# Patient Record
Sex: FEMALE | Race: WHITE | NOT HISPANIC OR LATINO | Employment: OTHER | URBAN - METROPOLITAN AREA
[De-identification: names, ages, dates, MRNs, and addresses within clinical notes are randomized per-mention and may not be internally consistent; named-entity substitution may affect disease eponyms.]

---

## 2020-07-16 ENCOUNTER — HISTORICAL (OUTPATIENT)
Dept: ADMINISTRATIVE | Facility: HOSPITAL | Age: 60
End: 2020-07-16

## 2020-07-16 LAB
ABS NEUT (OLG): 4.8 X10(3)/MCL (ref 2.1–9.2)
ALBUMIN SERPL-MCNC: 3.7 GM/DL (ref 3.4–5)
ALBUMIN/GLOB SERPL: 1.5 RATIO (ref 1.1–2)
ALP SERPL-CCNC: 130 UNIT/L (ref 40–150)
ALT SERPL-CCNC: 15 UNIT/L (ref 0–55)
APPEARANCE, UA: ABNORMAL
AST SERPL-CCNC: 13 UNIT/L (ref 5–34)
BACTERIA SPEC CULT: ABNORMAL /HPF
BASOPHILS # BLD AUTO: 0 X10(3)/MCL (ref 0–0.2)
BASOPHILS NFR BLD AUTO: 0 %
BILIRUB SERPL-MCNC: 0.5 MG/DL
BILIRUB UR QL STRIP: NEGATIVE
BILIRUBIN DIRECT+TOT PNL SERPL-MCNC: 0.2 MG/DL (ref 0–0.5)
BILIRUBIN DIRECT+TOT PNL SERPL-MCNC: 0.3 MG/DL (ref 0–0.8)
BUN SERPL-MCNC: 33.3 MG/DL (ref 9.8–20.1)
CALCIUM SERPL-MCNC: 8.7 MG/DL (ref 8.4–10.2)
CHLORIDE SERPL-SCNC: 108 MMOL/L (ref 98–107)
CO2 SERPL-SCNC: 22 MMOL/L (ref 23–31)
COLOR UR: YELLOW
CREAT SERPL-MCNC: 1.28 MG/DL (ref 0.55–1.02)
DEPRECATED CALCIDIOL+CALCIFEROL SERPL-MC: 17.6 NG/ML (ref 6.6–49.9)
EOSINOPHIL # BLD AUTO: 0.4 X10(3)/MCL (ref 0–0.9)
EOSINOPHIL NFR BLD AUTO: 6 %
ERYTHROCYTE [DISTWIDTH] IN BLOOD BY AUTOMATED COUNT: 15.5 % (ref 11.5–17)
FOLATE SERPL-MCNC: 12.4 NG/ML (ref 7–31.4)
GLOBULIN SER-MCNC: 2.5 GM/DL (ref 2.4–3.5)
GLUCOSE (UA): NEGATIVE
GLUCOSE SERPL-MCNC: 222 MG/DL (ref 82–115)
HCT VFR BLD AUTO: 28.6 % (ref 37–47)
HGB BLD-MCNC: 9 GM/DL (ref 12–16)
HGB UR QL STRIP: ABNORMAL
KETONES UR QL STRIP: NEGATIVE
LEUKOCYTE ESTERASE UR QL STRIP: ABNORMAL
LYMPHOCYTES # BLD AUTO: 1.3 X10(3)/MCL (ref 0.6–4.6)
LYMPHOCYTES NFR BLD AUTO: 18 %
MCH RBC QN AUTO: 28.9 PG (ref 27–31)
MCHC RBC AUTO-ENTMCNC: 31.5 GM/DL (ref 33–36)
MCV RBC AUTO: 92 FL (ref 80–94)
MONOCYTES # BLD AUTO: 0.6 X10(3)/MCL (ref 0.1–1.3)
MONOCYTES NFR BLD AUTO: 8 %
NEUTROPHILS # BLD AUTO: 4.8 X10(3)/MCL (ref 2.1–9.2)
NEUTROPHILS NFR BLD AUTO: 67 %
NITRITE UR QL STRIP: NEGATIVE
PH UR STRIP: 5 [PH] (ref 5–9)
PLATELET # BLD AUTO: 236 X10(3)/MCL (ref 130–400)
PMV BLD AUTO: 10.3 FL (ref 9.4–12.4)
POTASSIUM SERPL-SCNC: 4.8 MMOL/L (ref 3.5–5.1)
PROT SERPL-MCNC: 6.2 GM/DL (ref 5.8–7.6)
PROT UR QL STRIP: ABNORMAL
RBC # BLD AUTO: 3.11 X10(6)/MCL (ref 4.2–5.4)
RBC #/AREA URNS HPF: ABNORMAL /[HPF]
SODIUM SERPL-SCNC: 139 MMOL/L (ref 136–145)
SP GR UR STRIP: 1.01 (ref 1–1.03)
SQUAMOUS EPITHELIAL, UA: ABNORMAL
UROBILINOGEN UR STRIP-ACNC: 1
VIT B12 SERPL-MCNC: 437 PG/ML (ref 213–816)
WBC # SPEC AUTO: 7.2 X10(3)/MCL (ref 4.5–11.5)
WBC #/AREA URNS HPF: 117 /HPF (ref 0–3)

## 2020-07-27 ENCOUNTER — HISTORICAL (OUTPATIENT)
Dept: ADMINISTRATIVE | Facility: HOSPITAL | Age: 60
End: 2020-07-27

## 2020-07-27 LAB
APPEARANCE, UA: CLEAR
BACTERIA #/AREA URNS AUTO: ABNORMAL /HPF
BILIRUB UR QL STRIP: NEGATIVE
COLOR UR: YELLOW
GLUCOSE (UA): NEGATIVE
HGB UR QL STRIP: NEGATIVE
KETONES UR QL STRIP: NEGATIVE
LEUKOCYTE ESTERASE UR QL STRIP: ABNORMAL
NITRITE UR QL STRIP.AUTO: NEGATIVE
PH UR STRIP: 5.5 [PH] (ref 5–9)
PROT UR QL STRIP: ABNORMAL
RBC #/AREA URNS HPF: ABNORMAL /[HPF]
SP GR UR STRIP: 1.01 (ref 1–1.03)
SQUAMOUS EPITHELIAL, UA: ABNORMAL
UROBILINOGEN UR STRIP-ACNC: 0.2
WBC #/AREA URNS AUTO: 63 /HPF (ref 0–3)

## 2020-08-06 ENCOUNTER — HISTORICAL (OUTPATIENT)
Dept: ADMINISTRATIVE | Facility: HOSPITAL | Age: 60
End: 2020-08-06

## 2020-08-06 LAB
ABS NEUT (OLG): 3.9 X10(3)/MCL (ref 2.1–9.2)
BASOPHILS # BLD AUTO: 0 X10(3)/MCL (ref 0–0.2)
BASOPHILS NFR BLD AUTO: 1 %
EOSINOPHIL # BLD AUTO: 0.7 X10(3)/MCL (ref 0–0.9)
EOSINOPHIL NFR BLD AUTO: 11 %
ERYTHROCYTE [DISTWIDTH] IN BLOOD BY AUTOMATED COUNT: 14.8 % (ref 11.5–17)
HCT VFR BLD AUTO: 31.5 % (ref 37–47)
HGB BLD-MCNC: 10 GM/DL (ref 12–16)
LYMPHOCYTES # BLD AUTO: 1.4 X10(3)/MCL (ref 0.6–4.6)
LYMPHOCYTES NFR BLD AUTO: 21 %
MCH RBC QN AUTO: 29.3 PG (ref 27–31)
MCHC RBC AUTO-ENTMCNC: 31.7 GM/DL (ref 33–36)
MCV RBC AUTO: 92.4 FL (ref 80–94)
MONOCYTES # BLD AUTO: 0.4 X10(3)/MCL (ref 0.1–1.3)
MONOCYTES NFR BLD AUTO: 7 %
NEUTROPHILS # BLD AUTO: 3.9 X10(3)/MCL (ref 2.1–9.2)
NEUTROPHILS NFR BLD AUTO: 60 %
PLATELET # BLD AUTO: 247 X10(3)/MCL (ref 130–400)
PMV BLD AUTO: 9.9 FL (ref 9.4–12.4)
RBC # BLD AUTO: 3.41 X10(6)/MCL (ref 4.2–5.4)
WBC # SPEC AUTO: 6.4 X10(3)/MCL (ref 4.5–11.5)

## 2021-01-28 ENCOUNTER — HISTORICAL (OUTPATIENT)
Dept: ADMINISTRATIVE | Facility: HOSPITAL | Age: 61
End: 2021-01-28

## 2021-02-25 ENCOUNTER — HISTORICAL (OUTPATIENT)
Dept: ADMINISTRATIVE | Facility: HOSPITAL | Age: 61
End: 2021-02-25

## 2022-04-30 NOTE — OP NOTE
DATE OF SURGERY:    02/25/2021    SURGEON:  Harris Marquez MD    PREOPERATIVE DIAGNOSES:    1. Dense visually significant cataract in the right eye.  2. Approximately 260 degrees of posterior synechiae in the right eye from chronic uveitis.    POSTOPERATIVE DIAGNOSES:    1. Dense visually significant cataract in the right eye.  2. Approximately 260 degrees of posterior synechiae in the right eye from chronic uveitis.    NAME OF PROCEDURES:    1. Complex cataract extraction with implantation of posterior chamber intraocular lens, right eye.  2. Synechiolysis, right eye.  3. Placement of Malyugin ring, right eye.    ASSISTANT:  None.    ANESTHESIA:  Monitored anesthesia care with intracameral lidocaine.    COMPLICATIONS:  None.    BLOOD LOSS:  Nil.    PROCEDURE IN DETAIL:  After clinical examination and discussion of the risks, benefits, and alternatives, Ms Shah was consented for cataract extraction in the right eye.  She knew this would be a complex case based on the scarring of the iris to the lens capsule.  On the day of the operation, she was brought to the operating room, and all appropriate anesthesia monitors were applied.  Topical lidocaine was placed.  A paracentesis was created, and intracameral lidocaine was placed.  Viscoelastic then followed the lidocaine.  A keratome was used to enter the eye through a near clear corneal incision.  A cyclodialysis spatula was placed into the eye through the surgical wound, and the synechiae were loosened from the adhesions to the anterior capsule.  Subsequent to this, it was noted that the iris still did not dilate.  At this point, a Malyugin ring was placed on the iris.  This did give us adequate visualization to proceed.  At this point, a continuous curvilinear capsulorrhexis was made in the capsule.  Hydrodissection of the lens nucleus was achieved.  The cataract was then removed by way of phacoemulsification.  The I and A handpiece was used to remove the  remaining cortex.  Viscoelastic was then used to reform the anterior chamber and capsular bag.  Intraocular lens small ZCB00 with a power of 27.5 diopters was removed.  The package was inspected and found to be in satisfactory condition.  This lens was then placed in the injector system, and the lens was inserted into the eye.  Position was verified with the I and A handpiece.  At this point, the Malyugin ring was then removed from the eye.  The pupil did constrict nicely.  The I and A handpiece were then used to remove the remaining viscoelastic.  BSS on a cannula was then used to hydrate the surgical wounds.  Intraocular pressure was approximated at normal.  The wounds were inspected and found to be watertight to Weck-Delfina and digital pressure.  The eye was then given a final visual inspection and noted to have a well-placed PCIOL in good position within the capsular bag.  There was no bleeding from the iris after the stretching.  The intra-ocular pressure remained at approximately normal, and the wounds did appear to be watertight.  At this point, the eye was liberally coated with antibiotics and shielded without being pressure patched.  The patient was transported from the operating room, having tolerated the procedure well.        ______________________________  MD BECKY Zamudio/SK  DD:  03/09/2021  Time:  02:24PM  DT:  03/09/2021  Time:  02:54PM  Job #:  [id number]

## 2022-04-30 NOTE — OP NOTE
DATE OF SURGERY:    01/28/2021    SURGEON:  Harris Marquez MD  ASSISTANT:  None    PREOPERATIVE DIAGNOSES:    1. Very dense nuclear sclerotic cataract.  2. Pupillary miosis.  3. Proliferative diabetic retinopathy.    POSTOPERATIVE DIAGNOSES:    1. Very dense nuclear sclerotic cataract.  2. Pupillary miosis.  3. Proliferative diabetic retinopathy.    PROCEDURE:    1. Phacoemulsification with implantation of posterior chamber intraocular lens.  2. Injection of Shugarcaine to enhance the pharmacologic dilation.  3. Pupillary manipulation with mechanical and pharmaceutical means.    ANESTHESIA:  Monitored anesthesia care with intracameral lidocaine.    COMPLICATION:  None.    BLOOD LOSS:  Nil.    PROCEDURE IN DETAIL:  After clinical examination and discussion of the risks, benefits, and alternatives, including the dense nature of the cataract and the possibility for intraocular complications from that and her poorly dilating pupil, Ms. Shah was consented for cataract extraction in the left eye.  On the day of the operation, she was brought to the operating room and all appropriate anesthesia monitors were applied.  The eye was then prepped and draped in the usual sterile ophthalmic fashion.  A paracentesis site was created.  The intracameral lidocaine was placed in the eye.  Viscoelastic was then placed in the anterior chamber.  A keratome was used to enter the eye.  A continuous curvilinear capsulorrhexis was made.  Hydrodissection of the lens nucleus was achieved.  Prior to insertion of the phaco handpiece, Shugarcaine was placed into the eye and positioned 360 degrees on the surface of the iris and underneath the iris.  This did help maintain dilation somewhat throughout the procedure.  The phaco handpiece was introduced in the eye and the cataract was removed by way of phacoemulsification.  At multiple times throughout the procedure, additional viscoelastic was used to increase the visualization.   Additionally, mechanical manipulation of the iris was needed multiple times to visualize the surgical area.  At the end of the phacoemulsification, the I/A handpiece was introduced in the eye and the remaining cortex was removed.  The anterior chambers and capsular bag were then refilled with viscoelastic.  An intraocular lens model ZCB00 with a power of 23.0 diopters was removed from the package, inspected and found to be in satisfactory condition.  This lens was then placed in  and positioned within the eye.  Position was verified with the I/A handpiece.  The I/A handpiece was used to remove any remaining viscoelastic.  BSS on a cannula was then used to hydrate the surgical wounds.  Intraocular pressure was approximately normal.  The eye was given a final visual inspection and noted to have a well placed PCIOL in good position within the capsular bag.  The anterior chamber did appear to be deep and quiet.  The pupil was very small and the intraocular pressure was approximately normal.  The eye was then liberally coated with antibiotics and shielded without being pressure patched.  The patient was transported from the operating room having tolerated procedure well.        ______________________________  MD BECKY Zamudio/GEOVANY  DD:  02/03/2021  Time:  03:00PM  DT:  02/03/2021  Time:  03:27PM  Job #:  [id number]

## 2022-08-18 ENCOUNTER — LAB REQUISITION (OUTPATIENT)
Dept: LAB | Facility: HOSPITAL | Age: 62
End: 2022-08-18
Payer: MEDICARE

## 2022-08-18 DIAGNOSIS — E11.649 TYPE 2 DIABETES MELLITUS WITH HYPOGLYCEMIA WITHOUT COMA: ICD-10-CM

## 2022-08-18 DIAGNOSIS — Z79.4 LONG TERM (CURRENT) USE OF INSULIN: ICD-10-CM

## 2022-08-18 DIAGNOSIS — N30.00 ACUTE CYSTITIS WITHOUT HEMATURIA: ICD-10-CM

## 2022-08-18 PROCEDURE — 83036 HEMOGLOBIN GLYCOSYLATED A1C: CPT | Performed by: FAMILY MEDICINE

## 2022-08-25 LAB
EST. AVERAGE GLUCOSE BLD GHB EST-MCNC: 122.6 MG/DL
HBA1C MFR BLD: 5.9 %

## 2022-10-12 ENCOUNTER — HOSPITAL ENCOUNTER (EMERGENCY)
Facility: HOSPITAL | Age: 62
Discharge: HOME | End: 2022-10-12
Attending: STUDENT IN AN ORGANIZED HEALTH CARE EDUCATION/TRAINING PROGRAM
Payer: MEDICARE

## 2022-10-12 VITALS
HEART RATE: 80 BPM | BODY MASS INDEX: 26.49 KG/M2 | TEMPERATURE: 97 F | RESPIRATION RATE: 12 BRPM | DIASTOLIC BLOOD PRESSURE: 85 MMHG | WEIGHT: 159 LBS | OXYGEN SATURATION: 98 % | SYSTOLIC BLOOD PRESSURE: 177 MMHG | HEIGHT: 65 IN

## 2022-10-12 DIAGNOSIS — I95.1 ORTHOSTATIC HYPOTENSION: ICD-10-CM

## 2022-10-12 DIAGNOSIS — R53.1 GENERALIZED WEAKNESS: ICD-10-CM

## 2022-10-12 DIAGNOSIS — N39.0 URINARY TRACT INFECTION WITHOUT HEMATURIA, SITE UNSPECIFIED: Primary | ICD-10-CM

## 2022-10-12 DIAGNOSIS — E86.0 DEHYDRATION: ICD-10-CM

## 2022-10-12 DIAGNOSIS — R53.1 WEAKNESS: ICD-10-CM

## 2022-10-12 LAB
ALBUMIN SERPL-MCNC: 3.4 GM/DL (ref 3.4–4.8)
ALBUMIN/GLOB SERPL: 1.6 RATIO (ref 1.1–2)
ALP SERPL-CCNC: 110 UNIT/L (ref 40–150)
ALT SERPL-CCNC: 7 UNIT/L (ref 0–55)
APPEARANCE UR: ABNORMAL
AST SERPL-CCNC: 11 UNIT/L (ref 5–34)
BACTERIA #/AREA URNS AUTO: ABNORMAL /HPF
BASOPHILS # BLD AUTO: 0.06 X10(3)/MCL (ref 0–0.2)
BASOPHILS NFR BLD AUTO: 1.1 %
BILIRUB UR QL STRIP.AUTO: NEGATIVE MG/DL
BILIRUBIN DIRECT+TOT PNL SERPL-MCNC: 0.7 MG/DL
BUN SERPL-MCNC: 39.6 MG/DL (ref 9.8–20.1)
CALCIUM SERPL-MCNC: 8.6 MG/DL (ref 8.4–10.2)
CHLORIDE SERPL-SCNC: 112 MMOL/L (ref 98–107)
CO2 SERPL-SCNC: 21 MMOL/L (ref 23–31)
COLOR UR AUTO: YELLOW
CREAT SERPL-MCNC: 2.46 MG/DL (ref 0.55–1.02)
EOSINOPHIL # BLD AUTO: 0.19 X10(3)/MCL (ref 0–0.9)
EOSINOPHIL NFR BLD AUTO: 3.6 %
ERYTHROCYTE [DISTWIDTH] IN BLOOD BY AUTOMATED COUNT: 15 % (ref 11.5–17)
GFR SERPLBLD CREATININE-BSD FMLA CKD-EPI: 22 MLS/MIN/1.73/M2
GLOBULIN SER-MCNC: 2.1 GM/DL (ref 2.4–3.5)
GLUCOSE SERPL-MCNC: 166 MG/DL (ref 82–115)
GLUCOSE UR QL STRIP.AUTO: NEGATIVE MG/DL
HCT VFR BLD AUTO: 25.5 % (ref 37–47)
HGB BLD-MCNC: 8.7 GM/DL (ref 12–16)
IMM GRANULOCYTES # BLD AUTO: 0.02 X10(3)/MCL (ref 0–0.04)
IMM GRANULOCYTES NFR BLD AUTO: 0.4 %
KETONES UR QL STRIP.AUTO: ABNORMAL MG/DL
LEUKOCYTE ESTERASE UR QL STRIP.AUTO: ABNORMAL UNIT/L
LYMPHOCYTES # BLD AUTO: 1.09 X10(3)/MCL (ref 0.6–4.6)
LYMPHOCYTES NFR BLD AUTO: 20.9 %
MAGNESIUM SERPL-MCNC: 1.9 MG/DL (ref 1.6–2.6)
MCH RBC QN AUTO: 28.7 PG (ref 27–31)
MCHC RBC AUTO-ENTMCNC: 34.1 MG/DL (ref 33–36)
MCV RBC AUTO: 84.2 FL (ref 80–94)
MONOCYTES # BLD AUTO: 0.37 X10(3)/MCL (ref 0.1–1.3)
MONOCYTES NFR BLD AUTO: 7.1 %
NEUTROPHILS # BLD AUTO: 3.5 X10(3)/MCL (ref 2.1–9.2)
NEUTROPHILS NFR BLD AUTO: 66.9 %
NITRITE UR QL STRIP.AUTO: NEGATIVE
NRBC BLD AUTO-RTO: 0 %
PH UR STRIP.AUTO: 6 [PH]
PLATELET # BLD AUTO: 172 X10(3)/MCL (ref 130–400)
PMV BLD AUTO: 8.9 FL (ref 7.4–10.4)
POCT GLUCOSE: 96 MG/DL (ref 70–110)
POTASSIUM SERPL-SCNC: 4.3 MMOL/L (ref 3.5–5.1)
PROT SERPL-MCNC: 5.5 GM/DL (ref 5.8–7.6)
PROT UR QL STRIP.AUTO: ABNORMAL MG/DL
RBC # BLD AUTO: 3.03 X10(6)/MCL (ref 4.2–5.4)
RBC #/AREA URNS AUTO: 6 /HPF
RBC UR QL AUTO: ABNORMAL UNIT/L
SARS-COV-2 RDRP RESP QL NAA+PROBE: NEGATIVE
SODIUM SERPL-SCNC: 137 MMOL/L (ref 136–145)
SP GR UR STRIP.AUTO: 1.01 (ref 1–1.03)
SQUAMOUS #/AREA URNS AUTO: <5 /HPF
TROPONIN I SERPL-MCNC: 0.02 NG/ML (ref 0–0.04)
TROPONIN I SERPL-MCNC: 0.08 NG/ML (ref 0–0.04)
UROBILINOGEN UR STRIP-ACNC: 0.2 MG/DL
WBC # SPEC AUTO: 5.2 X10(3)/MCL (ref 4.5–11.5)
WBC #/AREA URNS AUTO: 295 /HPF

## 2022-10-12 PROCEDURE — 84484 ASSAY OF TROPONIN QUANT: CPT

## 2022-10-12 PROCEDURE — 93010 ELECTROCARDIOGRAM REPORT: CPT | Mod: ,,, | Performed by: INTERNAL MEDICINE

## 2022-10-12 PROCEDURE — 87635 SARS-COV-2 COVID-19 AMP PRB: CPT | Performed by: STUDENT IN AN ORGANIZED HEALTH CARE EDUCATION/TRAINING PROGRAM

## 2022-10-12 PROCEDURE — 93010 EKG 12-LEAD: ICD-10-PCS | Mod: ,,, | Performed by: INTERNAL MEDICINE

## 2022-10-12 PROCEDURE — 36415 COLL VENOUS BLD VENIPUNCTURE: CPT | Performed by: STUDENT IN AN ORGANIZED HEALTH CARE EDUCATION/TRAINING PROGRAM

## 2022-10-12 PROCEDURE — 83735 ASSAY OF MAGNESIUM: CPT | Performed by: STUDENT IN AN ORGANIZED HEALTH CARE EDUCATION/TRAINING PROGRAM

## 2022-10-12 PROCEDURE — 93005 ELECTROCARDIOGRAM TRACING: CPT

## 2022-10-12 PROCEDURE — 63600175 PHARM REV CODE 636 W HCPCS: Performed by: STUDENT IN AN ORGANIZED HEALTH CARE EDUCATION/TRAINING PROGRAM

## 2022-10-12 PROCEDURE — 25000003 PHARM REV CODE 250: Performed by: STUDENT IN AN ORGANIZED HEALTH CARE EDUCATION/TRAINING PROGRAM

## 2022-10-12 PROCEDURE — 84484 ASSAY OF TROPONIN QUANT: CPT | Performed by: STUDENT IN AN ORGANIZED HEALTH CARE EDUCATION/TRAINING PROGRAM

## 2022-10-12 PROCEDURE — 99285 EMERGENCY DEPT VISIT HI MDM: CPT | Mod: 25

## 2022-10-12 PROCEDURE — 81001 URINALYSIS AUTO W/SCOPE: CPT | Performed by: STUDENT IN AN ORGANIZED HEALTH CARE EDUCATION/TRAINING PROGRAM

## 2022-10-12 PROCEDURE — 85025 COMPLETE CBC W/AUTO DIFF WBC: CPT | Performed by: STUDENT IN AN ORGANIZED HEALTH CARE EDUCATION/TRAINING PROGRAM

## 2022-10-12 PROCEDURE — 80053 COMPREHEN METABOLIC PANEL: CPT | Performed by: STUDENT IN AN ORGANIZED HEALTH CARE EDUCATION/TRAINING PROGRAM

## 2022-10-12 PROCEDURE — 87077 CULTURE AEROBIC IDENTIFY: CPT | Performed by: STUDENT IN AN ORGANIZED HEALTH CARE EDUCATION/TRAINING PROGRAM

## 2022-10-12 PROCEDURE — 82962 GLUCOSE BLOOD TEST: CPT

## 2022-10-12 RX ORDER — CEFDINIR 300 MG/1
300 CAPSULE ORAL 2 TIMES DAILY
Qty: 20 CAPSULE | Refills: 0 | Status: SHIPPED | OUTPATIENT
Start: 2022-10-12 | End: 2022-10-22

## 2022-10-12 RX ORDER — ASPIRIN 325 MG
325 TABLET ORAL
Status: COMPLETED | OUTPATIENT
Start: 2022-10-12 | End: 2022-10-12

## 2022-10-12 RX ADMIN — ASPIRIN 325 MG ORAL TABLET 325 MG: 325 PILL ORAL at 11:10

## 2022-10-12 RX ADMIN — SODIUM CHLORIDE, POTASSIUM CHLORIDE, SODIUM LACTATE AND CALCIUM CHLORIDE 1000 ML: 600; 310; 30; 20 INJECTION, SOLUTION INTRAVENOUS at 07:10

## 2022-10-12 RX ADMIN — CEFTRIAXONE SODIUM 1 G: 1 INJECTION, POWDER, FOR SOLUTION INTRAMUSCULAR; INTRAVENOUS at 01:10

## 2022-10-12 NOTE — ED PROVIDER NOTES
Encounter Date: 10/12/2022    SCRIBE #1 NOTE: I, Meli Ashley, am scribing for, and in the presence of,  Adryan Majano IV, MD. I have scribed the following portions of the note - the EKG reading. Other sections scribed: HPI, ROS, PE.     History     Chief Complaint   Patient presents with    Weakness     C/o weakness and shakiness since this morning. Seen in ED yesterday for dehydration after doing a colon prep for colonoscopy today. Patient reports possible hypotension and hypoglycemia. Patient ate a poptart PTA. CBG in triage 96.      62 Y.O. female with a history of COPD, CKD, DM, PE, stroke, recurrent UTI, asthma, and O2 at home presents to the ED for weakness, shakiness, and possible hypotension onset yesterday. Pt went to [facility] ED yesterday via EMS for possible syncope during PCP appointment in which she became dizzy and had low BP after not eating and undergoing colonoscopy prep. At [facility] she was given 1L of fluid after diagnosis of dehydration and positive orthostatic hypotension. She did not undergo procedure today due to her BP and glucose dropping while there. Since then, she has had a pop tart and orange juice and reports feeling better, but still tired and shaky. She also states that yesterday's fluids helped. Pt denies fever, N/V, SOB, urinary problems, and being on blood thinners.    Pt's GI is Dr. Antonio Parnell MD.    The history is provided by the patient. No  was used.   Illness   The current episode started yesterday. The problem has been gradually improving. Pertinent negatives include no fever, no abdominal pain, no nausea, no vomiting, no congestion, no rhinorrhea, no sore throat, no cough, no shortness of breath and no rash. Recently, medical care has been given at another facility.   Review of patient's allergies indicates:   Allergen Reactions    Iodine Shortness Of Breath    Shellfish containing products Shortness Of Breath    Amlodipine     Actos  [pioglitazone] Nausea And Vomiting     Past Medical History:   Diagnosis Date    Asthma     Renal disorder      Past Surgical History:   Procedure Laterality Date    CHOLECYSTECTOMY  2003    EYE SURGERY      FOOT SURGERY  2020    TONSILLECTOMY      URETERAL STENT PLACEMENT       No family history on file.  Social History     Tobacco Use    Smoking status: Never    Smokeless tobacco: Never     Review of Systems   Constitutional:  Positive for fatigue. Negative for chills and fever.   HENT:  Negative for congestion, rhinorrhea and sore throat.    Eyes:  Negative for visual disturbance.   Respiratory:  Negative for cough and shortness of breath.    Cardiovascular:  Negative for chest pain and leg swelling.   Gastrointestinal:  Negative for abdominal pain, nausea and vomiting.   Genitourinary:  Negative for dysuria, hematuria, vaginal bleeding and vaginal discharge.   Musculoskeletal:  Negative for joint swelling.   Skin:  Negative for rash.   Neurological:  Positive for dizziness, weakness and light-headedness.   Psychiatric/Behavioral:  Negative for confusion.      Physical Exam     Initial Vitals [10/12/22 0712]   BP Pulse Resp Temp SpO2   96/67 83 20 97 °F (36.1 °C) 100 %      MAP       --         Physical Exam    Nursing note and vitals reviewed.  Constitutional: She is not diaphoretic. No distress.   HENT:   Head: Normocephalic and atraumatic.   Eyes: EOM are normal. Pupils are equal, round, and reactive to light.   Neck: Neck supple.   Normal range of motion.  Cardiovascular:  Normal rate and regular rhythm.           No murmur heard.  Pulmonary/Chest: Breath sounds normal. No respiratory distress. She has no wheezes. She has no rales.   Abdominal: Abdomen is soft. She exhibits no distension. There is no abdominal tenderness.   Musculoskeletal:         General: Normal range of motion.      Cervical back: Normal range of motion and neck supple.     Neurological: She is alert and oriented to person, place, and  time. She has normal strength.   Skin: Skin is warm. No rash noted.   Psychiatric: She has a normal mood and affect.       ED Course   Procedures  Labs Reviewed   COMPREHENSIVE METABOLIC PANEL - Abnormal; Notable for the following components:       Result Value    Chloride 112 (*)     Carbon Dioxide 21 (*)     Glucose Level 166 (*)     Blood Urea Nitrogen 39.6 (*)     Creatinine 2.46 (*)     Protein Total 5.5 (*)     Globulin 2.1 (*)     All other components within normal limits   URINALYSIS, REFLEX TO URINE CULTURE - Abnormal; Notable for the following components:    Appearance, UA Cloudy (*)     Protein, UA 2+ (*)     Ketones, UA Trace (*)     Blood, UA 2+ (*)     Leukocyte Esterase, UA 3+ (*)     All other components within normal limits   TROPONIN I - Abnormal; Notable for the following components:    Troponin-I 0.078 (*)     All other components within normal limits   CBC WITH DIFFERENTIAL - Abnormal; Notable for the following components:    RBC 3.03 (*)     Hgb 8.7 (*)     Hct 25.5 (*)     All other components within normal limits   URINALYSIS, MICROSCOPIC - Abnormal; Notable for the following components:    RBC, UA 6 (*)     WBC,  (*)     Bacteria, UA 3+ (*)     All other components within normal limits   SARS-COV-2 RNA AMPLIFICATION, QUAL - Normal   MAGNESIUM - Normal   TROPONIN I - Normal   CULTURE, URINE   CBC W/ AUTO DIFFERENTIAL    Narrative:     The following orders were created for panel order CBC auto differential.  Procedure                               Abnormality         Status                     ---------                               -----------         ------                     CBC with Differential[[id number]]        Abnormal            Final result                 Please view results for these tests on the individual orders.   POCT GLUCOSE     EKG Readings: (Independently Interpreted)   Initial Reading: No STEMI. Rhythm: Normal Sinus Rhythm. Heart Rate: 78. Ectopy: No Ectopy.  Conduction: Normal. ST Segments: Normal ST Segments. ST Segment Elevation: II, III, AVF, V4, V5 and V6. T Waves Flipped: I and AVL. Axis: Normal. Clinical Impression: Normal Sinus Rhythm   EKG performed at 0739 on 10/12/22.   T wave peaked inferolaterall, possible subtle inferolateral ST depression  No prior for comparison   Borderline prolongation of QRS at 100. No prior EKG.    Other EKG Interpretations: Repeat at 1037 - unchanged T wave inversion I avl, less significant depression inferolateral   ECG Results              EKG 12-lead (Final result)  Result time 10/12/22 13:09:49      Final result by Interface, Lab In [facility] (10/12/22 13:09:49)                   Narrative:    Test Reason : R53.1,    Vent. Rate : 078 BPM     Atrial Rate : 078 BPM     P-R Int : 168 ms          QRS Dur : 094 ms      QT Int : 408 ms       P-R-T Axes : 066 011 105 degrees     QTc Int : 465 ms    Normal sinus rhythm  T wave abnormality, consider lateral ischemia  Abnormal ECG  When compared with ECG of 12-OCT-2022 07:39,  No significant change was found  Confirmed by Tenzin Avila MD ([id number]) on 10/12/2022 1:09:38 PM    Referred By: [email or web address]   SELF           Confirmed By:Tenzin Avila MD                                     EKG 12-lead (Final result)  Result time 10/12/22 13:05:20      Final result by Interface, Lab In [facility] (10/12/22 13:05:20)                   Narrative:    Test Reason : R53.1,    Vent. Rate : 078 BPM     Atrial Rate : 078 BPM     P-R Int : 162 ms          QRS Dur : 100 ms      QT Int : 414 ms       P-R-T Axes : 077 -20 095 degrees     QTc Int : 471 ms    Normal sinus rhythm  Nonspecific T wave abnormality cannot exclude lateral ischemia  Abnormal ECG  No previous ECGs available  Confirmed by Tenzin Avila MD ([id number]) on 10/12/2022 1:05:09 PM    Referred By:             Confirmed By:Tenzin Avila MD                                  Imaging Results              X-Ray Chest AP Portable (Final result)  Result time  10/12/22 07:57:36      Final result by Angel Vega MD (10/12/22 07:57:36)                   Narrative:    EXAMINATION  XR CHEST AP PORTABLE    CLINICAL HISTORY  generalized weakness;    TECHNIQUE  A total of 1 frontal view(s) of the chest.    COMPARISON  23 June 2020    FINDINGS  Lines/tubes/devices: ECG leads overlie the imaged region.    The cardiomediastinal silhouette and central pulmonary vasculature are unremarkable for utilized technique.  The trachea is midline. There is no lobar consolidation, pleural effusion, or pneumothorax.    There is no acute osseous or extrathoracic abnormality.    IMPRESSION  No convincing acute radiographic abnormality.      Electronically signed by: Angel Vega  Date:    10/12/2022  Time:    07:57                                     Medications   lactated ringers bolus 1,000 mL (0 mLs Intravenous Stopped 10/12/22 1014)   aspirin tablet 325 mg (325 mg Oral Given 10/12/22 1119)   cefTRIAXone (ROCEPHIN) 1 g in dextrose 5 % in water (D5W) 5 % 50 mL IVPB (MB+) (0 g Intravenous Stopped 10/12/22 1333)     Medical Decision Making:   History:   Old Medical Records: I decided to obtain old medical records.  Initial Assessment:   61 yo with recurrent UTI, DM, recent colonoscopy prep with poor po intake - went to [facility] yesterday, dehydrated and orthostatic hypotension, hydrated, felt better and discharge. Back today for similar symptoms. Workup with UTI - history of recurrent, chronic R ureteral stent. Has resolved with cephalosporins prior per patient, no urine culture previous data available for review. Patient feels better after IVF here, not systemically ill. Treated with omnicef, will follow up culture results to ensure proper treatment and patient instructed to call urologist for follow up today.   Differential Diagnosis:   Orthostatic hypotension, dehydration, electrolyte derangement, LAMIN, infection   Independently Interpreted Test(s):   I have ordered and independently  interpreted EKG Reading(s) - see prior notes  Clinical Tests:   Lab Tests: Ordered and Reviewed  Radiological Study: Ordered and Reviewed  Medical Tests: Ordered and Reviewed  ED Management:  IVF        Scribe Attestation:   Scribe #1: I performed the above scribed service and the documentation accurately describes the services I performed. I attest to the accuracy of the note.    Attending Attestation:           Physician Attestation for Scribe:  Physician Attestation Statement for Scribe #1: I, Adryan Majano IV, MD, reviewed documentation, as scribed by Meli Ashley in my presence, and it is both accurate and complete.           ED Course as of 10/12/22 1455   Wed Oct 12, 2022   0754 Orthostatic positive - drop 130/90 to 80/40 with standing. Will continue fluid hydration.  [AC]   0842 Labs never sent up - will have to redraw at this time.  [AC]   1004 Cr improved from yesterday at [facility]. Trop mild elevation. Will repeat trop, EKG. Will reassess after IVF.  [AC]   1227 Troponin I: 0.017 [AC]   1227 UA with UTI [AC]   1248 Repeat trop negative, no CP, SOB. EKG with some T wave abnormalities as documented above - per chart review had abnormalities at [facility] yesterday, cannot see image of EKG from there. Troponin initial mild elevation likely 2/2 CKD, 2nd trop wnl. UA with UTI - patient reports history of chronic UTI, R ureteral stent. Follow with Dr. Pennington urology. No back pain, not systemically ill at this time. Unable to see previous urine culture results. Will give rocephin, prescribed 3rd generation cephalosporin and have follow with urologist. Patient feels better after IVF. Creatinine improved from yesterday. Will discharge, return precautions given. Instructed to call Dr. Pennington for expedited follow up.  [AC]      ED Course User Index  [AC] Adryan Majano IV, MD                 Clinical Impression:   Final diagnoses:  [R53.1] Weakness  [R53.1] Generalized weakness  [N39.0] Urinary tract infection without  hematuria, site unspecified (Primary)  [E86.0] Dehydration  [I95.1] Orthostatic hypotension        ED Disposition Condition    Discharge Stable          ED Prescriptions       Medication Sig Dispense Start Date End Date Auth. Provider    cefdinir (OMNICEF) 300 MG capsule Take 1 capsule (300 mg total) by mouth 2 (two) times daily. for 10 days 20 capsule 10/12/2022 10/22/2022 Adryan Majano IV, MD          Follow-up Information       Follow up With Specialties Details Why Contact Info    [facility] - Emergency Dept Emergency Medicine Go to  If symptoms worsen [address] [facility] [id number] [phone]    Sukhdev Pennington MD Pediatric Urology Schedule an appointment as soon as possible for a visit   [address] [facility] [address]  [facility]  [facility] [address]  [phone]      PCP  Schedule an appointment as soon as possible for a visit           I, Adryan Majano MD personally performed the history, PE, MDM, and procedures as documented above and agree with the scribe's documentation.        Adryan Majano IV, MD  10/12/22 1459       Adryan Majano IV, MD  10/13/22 [id number]

## 2022-10-12 NOTE — DISCHARGE INSTRUCTIONS
Call urologist for follow up     Follow up with your primary care physician in 2-3 days.      Return to the emergency department if any worsening symptoms, fever, chest pain, difficulty breathing, or any other new symptoms or concerns.      Please signup for MyChart as noted below in your paperwork to review all labwork, imaging results, and any other incidental findings from today's visit.       Advice after your visit:  Your visit in the emergency department is NOT definitive care - please follow-up with your primary care doctor and/or specialist within 1-2 days.  Please return if you have any worsening in your condition or if you have any other concerns.    If you had radiology exams like an XRAY or CT in the emergency Department the interpreation on them may be preliminary - there may be less time sensitive findings on the reports please obtain these reports within 24 hours from the hospital or by using your out on your mobile phone to access records.  Bring these to your primary care doctor and/or specialist for further review of incidental findings.    Please review any LAB WORK from your visit today with your primary care physician.    If you were prescribed OPIATE PAIN MEDICATION - please understand of these medications can be addictive, you may fill less of the prescription was written for, you do not have to take the full prescription.  You may discard what you do not use.  Please seek help if you feel you are having problems with addiction.  Do not drive or operate heavy machinery if you are taking sedating medications.  Do not mix these medications with alcohol.      If you had a SPLINT placed in the emergency department if you have severe pain numbness tingling or discoloration of year digits please remove the splint and return to the emergency department for further evaluation as this may represent a sign of compromise to the nerves or blood vessels due to swelling.    If you had SUTURES in the  emergency department please have them removed in the prescribed time frame typically within 7-14 days.  You may shower but please do not bathe or swim.  Keep the wounds clean and dry and covered with a clean dressing.  Please return if he have any signs of infection like redness or drainage or pain at the suture site.    Please take the full course of  any ANTIBIOTICS you were prescribed - incomplete courses of antibiotics can cause resistance to antibiotics in the future which will make it difficult to treat any infections you may have.

## 2022-10-14 LAB — BACTERIA UR CULT: ABNORMAL

## 2022-12-09 DIAGNOSIS — N18.9 ANEMIA OF CHRONIC RENAL FAILURE, UNSPECIFIED CKD STAGE: ICD-10-CM

## 2022-12-09 DIAGNOSIS — D63.1 ANEMIA OF CHRONIC RENAL FAILURE, UNSPECIFIED CKD STAGE: ICD-10-CM

## 2022-12-09 DIAGNOSIS — N18.4 CHRONIC KIDNEY DISEASE, STAGE IV (SEVERE): ICD-10-CM

## 2022-12-21 ENCOUNTER — TELEPHONE (OUTPATIENT)
Dept: INFUSION THERAPY | Facility: HOSPITAL | Age: 62
End: 2022-12-21
Payer: MEDICARE

## 2022-12-22 ENCOUNTER — LAB VISIT (OUTPATIENT)
Dept: LAB | Facility: HOSPITAL | Age: 62
End: 2022-12-22
Attending: STUDENT IN AN ORGANIZED HEALTH CARE EDUCATION/TRAINING PROGRAM
Payer: MEDICARE

## 2022-12-22 ENCOUNTER — INFUSION (OUTPATIENT)
Dept: INFUSION THERAPY | Facility: HOSPITAL | Age: 62
End: 2022-12-22
Attending: STUDENT IN AN ORGANIZED HEALTH CARE EDUCATION/TRAINING PROGRAM
Payer: MEDICARE

## 2022-12-22 VITALS
HEART RATE: 82 BPM | BODY MASS INDEX: 27.39 KG/M2 | OXYGEN SATURATION: 100 % | WEIGHT: 164.38 LBS | TEMPERATURE: 97 F | DIASTOLIC BLOOD PRESSURE: 79 MMHG | SYSTOLIC BLOOD PRESSURE: 116 MMHG | HEIGHT: 65 IN | RESPIRATION RATE: 16 BRPM

## 2022-12-22 DIAGNOSIS — N18.9 ANEMIA OF CHRONIC RENAL FAILURE, UNSPECIFIED CKD STAGE: ICD-10-CM

## 2022-12-22 DIAGNOSIS — N18.4 CHRONIC KIDNEY DISEASE, STAGE IV (SEVERE): Primary | ICD-10-CM

## 2022-12-22 DIAGNOSIS — I12.9 PARENCHYMAL RENAL HYPERTENSION: ICD-10-CM

## 2022-12-22 DIAGNOSIS — N16: ICD-10-CM

## 2022-12-22 DIAGNOSIS — D63.1 ANEMIA OF CHRONIC RENAL FAILURE, UNSPECIFIED CKD STAGE: ICD-10-CM

## 2022-12-22 LAB
FERRITIN SERPL-MCNC: 1065.42 NG/ML (ref 4.63–204)
HCT VFR BLD AUTO: 24.1 % (ref 37–47)
HGB BLD-MCNC: 7.8 GM/DL (ref 12–16)
IRON SATN MFR SERPL: 28 % (ref 20–50)
IRON SERPL-MCNC: 49 UG/DL (ref 50–170)
TIBC SERPL-MCNC: 129 UG/DL (ref 70–310)
TIBC SERPL-MCNC: 178 UG/DL (ref 250–450)
TRANSFERRIN SERPL-MCNC: 151 MG/DL (ref 173–360)

## 2022-12-22 PROCEDURE — 85014 HEMATOCRIT: CPT

## 2022-12-22 PROCEDURE — 96372 THER/PROPH/DIAG INJ SC/IM: CPT

## 2022-12-22 PROCEDURE — 36415 COLL VENOUS BLD VENIPUNCTURE: CPT

## 2022-12-22 PROCEDURE — 63600175 PHARM REV CODE 636 W HCPCS: Mod: JG | Performed by: STUDENT IN AN ORGANIZED HEALTH CARE EDUCATION/TRAINING PROGRAM

## 2022-12-22 PROCEDURE — 82728 ASSAY OF FERRITIN: CPT

## 2022-12-22 PROCEDURE — 83550 IRON BINDING TEST: CPT

## 2022-12-22 RX ADMIN — EPOETIN ALFA 10000 UNITS: 10000 SOLUTION INTRAVENOUS; SUBCUTANEOUS at 01:12

## 2023-01-18 ENCOUNTER — LAB VISIT (OUTPATIENT)
Dept: LAB | Facility: HOSPITAL | Age: 63
End: 2023-01-18
Attending: FAMILY MEDICINE
Payer: MEDICARE

## 2023-01-18 DIAGNOSIS — J90 EXUDATIVE PLEURISY: ICD-10-CM

## 2023-01-18 DIAGNOSIS — D63.1 ANEMIA OF CHRONIC RENAL FAILURE, UNSPECIFIED CKD STAGE: ICD-10-CM

## 2023-01-18 DIAGNOSIS — R07.89 OTHER CHEST PAIN: ICD-10-CM

## 2023-01-18 DIAGNOSIS — R68.89 MECHANICAL AND MOTOR PROBLEMS WITH INTERNAL ORGANS: ICD-10-CM

## 2023-01-18 DIAGNOSIS — Z92.89 STATUS POST ALCOHOL DETOXIFICATION: ICD-10-CM

## 2023-01-18 DIAGNOSIS — N18.4 CHRONIC KIDNEY DISEASE, STAGE IV (SEVERE): ICD-10-CM

## 2023-01-18 DIAGNOSIS — D72.829 LEUKOCYTOSIS, UNSPECIFIED TYPE: ICD-10-CM

## 2023-01-18 DIAGNOSIS — D72.9 WHITE BLOOD CELL DISORDER: ICD-10-CM

## 2023-01-18 DIAGNOSIS — N16: ICD-10-CM

## 2023-01-18 DIAGNOSIS — I50.33 ACUTE ON CHRONIC DIASTOLIC HEART FAILURE: ICD-10-CM

## 2023-01-18 DIAGNOSIS — I12.9 PARENCHYMAL RENAL HYPERTENSION: ICD-10-CM

## 2023-01-18 DIAGNOSIS — N17.9 ACUTE KIDNEY FAILURE, UNSPECIFIED: ICD-10-CM

## 2023-01-18 DIAGNOSIS — N18.9 CHRONIC KIDNEY DISEASE, UNSPECIFIED: ICD-10-CM

## 2023-01-18 DIAGNOSIS — R06.09 DYSPNEA ON EXERTION: Primary | ICD-10-CM

## 2023-01-18 DIAGNOSIS — N18.9 ANEMIA OF CHRONIC RENAL FAILURE, UNSPECIFIED CKD STAGE: ICD-10-CM

## 2023-01-18 LAB
ALBUMIN SERPL-MCNC: 2.9 G/DL (ref 3.4–4.8)
ALBUMIN/GLOB SERPL: 0.9 RATIO (ref 1.1–2)
ALP SERPL-CCNC: 108 UNIT/L (ref 40–150)
ALT SERPL-CCNC: 7 UNIT/L (ref 0–55)
APPEARANCE UR: ABNORMAL
AST SERPL-CCNC: 8 UNIT/L (ref 5–34)
BACTERIA #/AREA URNS AUTO: ABNORMAL /HPF
BILIRUB UR QL STRIP.AUTO: NEGATIVE MG/DL
BILIRUBIN DIRECT+TOT PNL SERPL-MCNC: 0.8 MG/DL
BUN SERPL-MCNC: 63.9 MG/DL (ref 9.8–20.1)
CALCIUM SERPL-MCNC: 8.2 MG/DL (ref 8.4–10.2)
CHLORIDE SERPL-SCNC: 99 MMOL/L (ref 98–107)
CO2 SERPL-SCNC: 19 MMOL/L (ref 23–31)
COLOR UR AUTO: YELLOW
CREAT SERPL-MCNC: 5.75 MG/DL (ref 0.55–1.02)
GFR SERPLBLD CREATININE-BSD FMLA CKD-EPI: 8 MLS/MIN/1.73/M2
GLOBULIN SER-MCNC: 3.1 GM/DL (ref 2.4–3.5)
GLUCOSE SERPL-MCNC: 151 MG/DL (ref 82–115)
GLUCOSE UR QL STRIP.AUTO: ABNORMAL MG/DL
HCT VFR BLD AUTO: 24 % (ref 37–47)
HGB BLD-MCNC: 7.5 GM/DL (ref 12–16)
KETONES UR QL STRIP.AUTO: ABNORMAL MG/DL
LEUKOCYTE ESTERASE UR QL STRIP.AUTO: ABNORMAL UNIT/L
NITRITE UR QL STRIP.AUTO: POSITIVE
PH UR STRIP.AUTO: 6 [PH]
POTASSIUM SERPL-SCNC: 4.4 MMOL/L (ref 3.5–5.1)
PROT SERPL-MCNC: 6 GM/DL (ref 5.8–7.6)
PROT UR QL STRIP.AUTO: ABNORMAL MG/DL
RBC #/AREA URNS AUTO: 5 /HPF
RBC UR QL AUTO: ABNORMAL UNIT/L
SODIUM SERPL-SCNC: 131 MMOL/L (ref 136–145)
SP GR UR STRIP.AUTO: 1.01 (ref 1–1.03)
SQUAMOUS #/AREA URNS AUTO: <5 /HPF
UROBILINOGEN UR STRIP-ACNC: 0.2 MG/DL
WBC #/AREA URNS AUTO: 306 /HPF

## 2023-01-18 PROCEDURE — 87088 URINE BACTERIA CULTURE: CPT

## 2023-01-18 PROCEDURE — 85018 HEMOGLOBIN: CPT

## 2023-01-18 PROCEDURE — 81001 URINALYSIS AUTO W/SCOPE: CPT

## 2023-01-18 PROCEDURE — 80053 COMPREHEN METABOLIC PANEL: CPT

## 2023-01-18 PROCEDURE — 36415 COLL VENOUS BLD VENIPUNCTURE: CPT

## 2023-01-18 PROCEDURE — 87077 CULTURE AEROBIC IDENTIFY: CPT

## 2023-01-19 ENCOUNTER — INFUSION (OUTPATIENT)
Dept: INFUSION THERAPY | Facility: HOSPITAL | Age: 63
End: 2023-01-19
Attending: STUDENT IN AN ORGANIZED HEALTH CARE EDUCATION/TRAINING PROGRAM
Payer: MEDICARE

## 2023-01-19 VITALS
WEIGHT: 164.38 LBS | HEIGHT: 65 IN | DIASTOLIC BLOOD PRESSURE: 61 MMHG | HEART RATE: 85 BPM | SYSTOLIC BLOOD PRESSURE: 105 MMHG | BODY MASS INDEX: 27.39 KG/M2 | RESPIRATION RATE: 18 BRPM

## 2023-01-19 DIAGNOSIS — N18.4 CHRONIC KIDNEY DISEASE, STAGE IV (SEVERE): Primary | ICD-10-CM

## 2023-01-19 DIAGNOSIS — D63.1 ANEMIA OF CHRONIC RENAL FAILURE, UNSPECIFIED CKD STAGE: ICD-10-CM

## 2023-01-19 DIAGNOSIS — N18.9 ANEMIA OF CHRONIC RENAL FAILURE, UNSPECIFIED CKD STAGE: ICD-10-CM

## 2023-01-19 PROCEDURE — 96372 THER/PROPH/DIAG INJ SC/IM: CPT

## 2023-01-19 PROCEDURE — 63600175 PHARM REV CODE 636 W HCPCS: Mod: JG,EC | Performed by: STUDENT IN AN ORGANIZED HEALTH CARE EDUCATION/TRAINING PROGRAM

## 2023-01-19 RX ADMIN — ERYTHROPOIETIN 10000 UNITS: 10000 INJECTION, SOLUTION INTRAVENOUS; SUBCUTANEOUS at 01:01

## 2023-01-20 LAB — BACTERIA UR CULT: ABNORMAL

## 2023-01-31 ENCOUNTER — APPOINTMENT (OUTPATIENT)
Dept: LAB | Facility: HOSPITAL | Age: 63
End: 2023-01-31
Attending: STUDENT IN AN ORGANIZED HEALTH CARE EDUCATION/TRAINING PROGRAM
Payer: MEDICARE

## 2023-01-31 DIAGNOSIS — N18.9 ANEMIA IN CHRONIC KIDNEY DISEASE, UNSPECIFIED CKD STAGE: ICD-10-CM

## 2023-01-31 DIAGNOSIS — D63.1 ANEMIA IN CHRONIC KIDNEY DISEASE, UNSPECIFIED CKD STAGE: ICD-10-CM

## 2023-01-31 DIAGNOSIS — N18.4 CHRONIC KIDNEY DISEASE, STAGE IV (SEVERE): Primary | ICD-10-CM

## 2023-01-31 LAB
HCT VFR BLD AUTO: 21.9 % (ref 37–47)
HGB BLD-MCNC: 6.8 GM/DL (ref 12–16)

## 2023-01-31 PROCEDURE — 36415 COLL VENOUS BLD VENIPUNCTURE: CPT

## 2023-01-31 PROCEDURE — 85014 HEMATOCRIT: CPT

## 2023-02-02 ENCOUNTER — INFUSION (OUTPATIENT)
Dept: INFUSION THERAPY | Facility: HOSPITAL | Age: 63
End: 2023-02-02
Attending: STUDENT IN AN ORGANIZED HEALTH CARE EDUCATION/TRAINING PROGRAM
Payer: MEDICARE

## 2023-02-02 VITALS
SYSTOLIC BLOOD PRESSURE: 129 MMHG | HEIGHT: 65 IN | OXYGEN SATURATION: 98 % | TEMPERATURE: 98 F | BODY MASS INDEX: 27.39 KG/M2 | WEIGHT: 164.38 LBS | DIASTOLIC BLOOD PRESSURE: 72 MMHG | HEART RATE: 84 BPM | RESPIRATION RATE: 18 BRPM

## 2023-02-02 DIAGNOSIS — N18.9 ANEMIA OF CHRONIC RENAL FAILURE, UNSPECIFIED CKD STAGE: ICD-10-CM

## 2023-02-02 DIAGNOSIS — D63.1 ANEMIA OF CHRONIC RENAL FAILURE, UNSPECIFIED CKD STAGE: ICD-10-CM

## 2023-02-02 DIAGNOSIS — N18.4 CHRONIC KIDNEY DISEASE, STAGE IV (SEVERE): Primary | ICD-10-CM

## 2023-02-02 PROCEDURE — 96372 THER/PROPH/DIAG INJ SC/IM: CPT

## 2023-02-02 PROCEDURE — 63600175 PHARM REV CODE 636 W HCPCS: Mod: JG,EC | Performed by: STUDENT IN AN ORGANIZED HEALTH CARE EDUCATION/TRAINING PROGRAM

## 2023-02-02 RX ADMIN — EPOETIN ALFA 12000 UNITS: 20000 SOLUTION INTRAVENOUS; SUBCUTANEOUS at 09:02

## 2023-02-13 ENCOUNTER — LAB VISIT (OUTPATIENT)
Dept: LAB | Facility: HOSPITAL | Age: 63
End: 2023-02-13
Attending: STUDENT IN AN ORGANIZED HEALTH CARE EDUCATION/TRAINING PROGRAM
Payer: MEDICARE

## 2023-02-13 DIAGNOSIS — I12.9 PARENCHYMAL RENAL HYPERTENSION: ICD-10-CM

## 2023-02-13 DIAGNOSIS — N18.4 CHRONIC KIDNEY DISEASE, STAGE IV (SEVERE): Primary | ICD-10-CM

## 2023-02-13 DIAGNOSIS — N17.9 ACUTE KIDNEY FAILURE, UNSPECIFIED: ICD-10-CM

## 2023-02-13 DIAGNOSIS — D63.1 ANEMIA OF CHRONIC RENAL FAILURE: ICD-10-CM

## 2023-02-13 DIAGNOSIS — N13.30 HYDRONEPHROSIS: ICD-10-CM

## 2023-02-13 DIAGNOSIS — N18.9 ANEMIA OF CHRONIC RENAL FAILURE: ICD-10-CM

## 2023-02-13 LAB
ALBUMIN SERPL-MCNC: 3 G/DL (ref 3.4–4.8)
BUN SERPL-MCNC: 54.3 MG/DL (ref 9.8–20.1)
CALCIUM SERPL-MCNC: 7.9 MG/DL (ref 8.4–10.2)
CHLORIDE SERPL-SCNC: 106 MMOL/L (ref 98–107)
CO2 SERPL-SCNC: 17 MMOL/L (ref 23–31)
CREAT SERPL-MCNC: 4.64 MG/DL (ref 0.55–1.02)
ERYTHROCYTE [DISTWIDTH] IN BLOOD BY AUTOMATED COUNT: 17.3 % (ref 11.5–17)
GFR SERPLBLD CREATININE-BSD FMLA CKD-EPI: 10 MLS/MIN/1.73/M2
GLUCOSE SERPL-MCNC: 172 MG/DL (ref 82–115)
HCT VFR BLD AUTO: 20.1 % (ref 37–47)
HGB BLD-MCNC: 6.2 GM/DL (ref 12–16)
MAGNESIUM SERPL-MCNC: 1.9 MG/DL (ref 1.6–2.6)
MCH RBC QN AUTO: 27 PG
MCHC RBC AUTO-ENTMCNC: 30.8 MG/DL (ref 33–36)
MCV RBC AUTO: 87.4 FL (ref 80–94)
NRBC BLD AUTO-RTO: 0 %
PHOSPHATE SERPL-MCNC: 4.8 MG/DL (ref 2.3–4.7)
PLATELET # BLD AUTO: 230 X10(3)/MCL (ref 130–400)
PMV BLD AUTO: 8.5 FL (ref 7.4–10.4)
POTASSIUM SERPL-SCNC: 5.6 MMOL/L (ref 3.5–5.1)
RBC # BLD AUTO: 2.3 X10(6)/MCL (ref 4.2–5.4)
SODIUM SERPL-SCNC: 133 MMOL/L (ref 136–145)
WBC # SPEC AUTO: 9 X10(3)/MCL (ref 4.5–11.5)

## 2023-02-13 PROCEDURE — 85027 COMPLETE CBC AUTOMATED: CPT

## 2023-02-13 PROCEDURE — 80069 RENAL FUNCTION PANEL: CPT

## 2023-02-13 PROCEDURE — 83735 ASSAY OF MAGNESIUM: CPT

## 2023-02-13 PROCEDURE — 36415 COLL VENOUS BLD VENIPUNCTURE: CPT

## 2023-04-05 ENCOUNTER — TELEPHONE (OUTPATIENT)
Dept: TRANSPLANT | Facility: CLINIC | Age: 63
End: 2023-04-05
Payer: MEDICARE

## 2023-04-11 ENCOUNTER — TELEPHONE (OUTPATIENT)
Dept: TRANSPLANT | Facility: CLINIC | Age: 63
End: 2023-04-11
Payer: MEDICARE

## 2023-04-14 DIAGNOSIS — Z76.82 ORGAN TRANSPLANT CANDIDATE: Primary | ICD-10-CM

## 2023-04-28 ENCOUNTER — HOSPITAL ENCOUNTER (OUTPATIENT)
Dept: RADIOLOGY | Facility: HOSPITAL | Age: 63
Discharge: HOME | End: 2023-04-28
Attending: SURGERY
Payer: MEDICARE

## 2023-04-28 DIAGNOSIS — Z01.818 OTHER SPECIFIED PRE-OPERATIVE EXAMINATION: Primary | ICD-10-CM

## 2023-04-28 DIAGNOSIS — Z01.818 OTHER SPECIFIED PRE-OPERATIVE EXAMINATION: ICD-10-CM

## 2023-04-28 PROCEDURE — 71046 X-RAY EXAM CHEST 2 VIEWS: CPT | Mod: TC,NTX

## 2023-05-01 ENCOUNTER — TELEPHONE (OUTPATIENT)
Dept: TRANSPLANT | Facility: CLINIC | Age: 63
End: 2023-05-01
Payer: MEDICARE

## 2023-05-02 ENCOUNTER — TELEPHONE (OUTPATIENT)
Dept: TRANSPLANT | Facility: CLINIC | Age: 63
End: 2023-05-02
Payer: MEDICARE

## 2023-05-02 NOTE — TELEPHONE ENCOUNTER
MA notes per adherence form     FOR THE PAST THREE MONTHS:    0-AMA's  0-No-shows    No concerns with care giving, transportation, or mental health    Brought over to clinic to be scanned in.    Christa Harvey  Abdominal Transplant MA

## 2023-05-07 ENCOUNTER — PATIENT MESSAGE (OUTPATIENT)
Dept: ADMINISTRATIVE | Facility: OTHER | Age: 63
End: 2023-05-07
Payer: MEDICARE

## 2023-05-08 ENCOUNTER — PATIENT MESSAGE (OUTPATIENT)
Dept: ADMINISTRATIVE | Facility: OTHER | Age: 63
End: 2023-05-08
Payer: MEDICARE

## 2023-05-22 NOTE — PROGRESS NOTES
Verified demographics, appt times, lab work including HIV and Hepatitis which is required for transplantation. Informed patient that a caregiver is required and to bring a light snack. Assessed for  needs. Noted patient does not require . Updated in Epic:  health/surgical history, allergies, and family history. Patient states understanding , given opportunity to ask questions and all questions answered. Reminded patient to refer to appointment slips and education information sent previously.

## 2023-05-23 ENCOUNTER — OFFICE VISIT (OUTPATIENT)
Dept: TRANSPLANT | Facility: CLINIC | Age: 63
End: 2023-05-23
Payer: MEDICARE

## 2023-05-23 ENCOUNTER — HOSPITAL ENCOUNTER (OUTPATIENT)
Dept: RADIOLOGY | Facility: HOSPITAL | Age: 63
Discharge: HOME | End: 2023-05-23
Attending: NURSE PRACTITIONER
Payer: MEDICARE

## 2023-05-23 ENCOUNTER — TELEPHONE (OUTPATIENT)
Dept: TRANSPLANT | Facility: CLINIC | Age: 63
End: 2023-05-23
Payer: MEDICARE

## 2023-05-23 VITALS
SYSTOLIC BLOOD PRESSURE: 179 MMHG | OXYGEN SATURATION: 97 % | WEIGHT: 161.38 LBS | BODY MASS INDEX: 26.89 KG/M2 | RESPIRATION RATE: 18 BRPM | HEART RATE: 68 BPM | HEIGHT: 65 IN | DIASTOLIC BLOOD PRESSURE: 77 MMHG | TEMPERATURE: 97 F

## 2023-05-23 DIAGNOSIS — N18.6 TYPE 2 DIABETES MELLITUS WITH CHRONIC KIDNEY DISEASE ON CHRONIC DIALYSIS, WITH LONG-TERM CURRENT USE OF INSULIN: ICD-10-CM

## 2023-05-23 DIAGNOSIS — E11.22 TYPE 2 DIABETES MELLITUS WITH CHRONIC KIDNEY DISEASE ON CHRONIC DIALYSIS, WITH LONG-TERM CURRENT USE OF INSULIN: ICD-10-CM

## 2023-05-23 DIAGNOSIS — Z76.82 ORGAN TRANSPLANT CANDIDATE: ICD-10-CM

## 2023-05-23 DIAGNOSIS — Z99.2 TYPE 2 DIABETES MELLITUS WITH CHRONIC KIDNEY DISEASE ON CHRONIC DIALYSIS, WITH LONG-TERM CURRENT USE OF INSULIN: ICD-10-CM

## 2023-05-23 DIAGNOSIS — I15.0 RENOVASCULAR HYPERTENSION: ICD-10-CM

## 2023-05-23 DIAGNOSIS — N18.6 ESRD ON HEMODIALYSIS: ICD-10-CM

## 2023-05-23 DIAGNOSIS — D63.1 ANEMIA OF CHRONIC RENAL FAILURE, STAGE 5: ICD-10-CM

## 2023-05-23 DIAGNOSIS — Z79.4 TYPE 2 DIABETES MELLITUS WITH CHRONIC KIDNEY DISEASE ON CHRONIC DIALYSIS, WITH LONG-TERM CURRENT USE OF INSULIN: ICD-10-CM

## 2023-05-23 DIAGNOSIS — R80.9 PROTEINURIA, UNSPECIFIED TYPE: ICD-10-CM

## 2023-05-23 DIAGNOSIS — Z01.818 PRE-TRANSPLANT EVALUATION FOR CHRONIC KIDNEY DISEASE: Primary | ICD-10-CM

## 2023-05-23 DIAGNOSIS — N13.732 VESICOURETERAL-REFLUX WITH REFLUX NEPHROPATHY WITH HYDROURETER, BILATERAL: ICD-10-CM

## 2023-05-23 DIAGNOSIS — E78.5 HYPERLIPIDEMIA, UNSPECIFIED HYPERLIPIDEMIA TYPE: ICD-10-CM

## 2023-05-23 DIAGNOSIS — J44.9 CHRONIC OBSTRUCTIVE PULMONARY DISEASE, UNSPECIFIED COPD TYPE: ICD-10-CM

## 2023-05-23 DIAGNOSIS — N18.5 ANEMIA OF CHRONIC RENAL FAILURE, STAGE 5: ICD-10-CM

## 2023-05-23 DIAGNOSIS — Z96.0 STATUS POST PLACEMENT OF URETERAL STENT: ICD-10-CM

## 2023-05-23 DIAGNOSIS — Z99.2 ESRD ON HEMODIALYSIS: ICD-10-CM

## 2023-05-23 PROBLEM — E11.9 TYPE 2 DIABETES MELLITUS: Status: ACTIVE | Noted: 2022-08-18

## 2023-05-23 PROBLEM — N18.4 CHRONIC KIDNEY DISEASE, STAGE IV (SEVERE): Status: RESOLVED | Noted: 2022-12-09 | Resolved: 2023-05-23

## 2023-05-23 PROBLEM — I10 HYPERTENSION: Status: ACTIVE | Noted: 2022-08-11

## 2023-05-23 PROCEDURE — 72170 X-RAY EXAM OF PELVIS: CPT | Mod: 26,TXP,, | Performed by: RADIOLOGY

## 2023-05-23 PROCEDURE — 99999 PR PBB SHADOW E&M-EST. PATIENT-LVL V: CPT | Mod: PBBFAC,TXP,, | Performed by: NURSE PRACTITIONER

## 2023-05-23 PROCEDURE — 93978 VASCULAR STUDY: CPT | Mod: 26,TXP,, | Performed by: STUDENT IN AN ORGANIZED HEALTH CARE EDUCATION/TRAINING PROGRAM

## 2023-05-23 PROCEDURE — 71046 X-RAY EXAM CHEST 2 VIEWS: CPT | Mod: TC,TXP

## 2023-05-23 PROCEDURE — 99205 PR OFFICE/OUTPT VISIT, NEW, LEVL V, 60-74 MIN: ICD-10-PCS | Mod: S$PBB,TXP,, | Performed by: NURSE PRACTITIONER

## 2023-05-23 PROCEDURE — 72170 XR PELVIS ROUTINE AP: ICD-10-PCS | Mod: 26,TXP,, | Performed by: RADIOLOGY

## 2023-05-23 PROCEDURE — 93978 VASCULAR STUDY: CPT | Mod: TC,TXP

## 2023-05-23 PROCEDURE — 99204 OFFICE O/P NEW MOD 45 MIN: CPT | Mod: S$PBB,TXP,, | Performed by: PHYSICIAN ASSISTANT

## 2023-05-23 PROCEDURE — 99205 OFFICE O/P NEW HI 60 MIN: CPT | Mod: S$PBB,TXP,, | Performed by: NURSE PRACTITIONER

## 2023-05-23 PROCEDURE — 99215 OFFICE O/P EST HI 40 MIN: CPT | Mod: PBBFAC,25,TXP | Performed by: NURSE PRACTITIONER

## 2023-05-23 PROCEDURE — 71046 XR CHEST PA AND LATERAL: ICD-10-PCS | Mod: 26,TXP,, | Performed by: RADIOLOGY

## 2023-05-23 PROCEDURE — 99204 PR OFFICE/OUTPT VISIT, NEW, LEVL IV, 45-59 MIN: ICD-10-PCS | Mod: S$PBB,TXP,, | Performed by: PHYSICIAN ASSISTANT

## 2023-05-23 PROCEDURE — 99999 PR PBB SHADOW E&M-EST. PATIENT-LVL V: ICD-10-PCS | Mod: PBBFAC,TXP,, | Performed by: NURSE PRACTITIONER

## 2023-05-23 PROCEDURE — 71046 X-RAY EXAM CHEST 2 VIEWS: CPT | Mod: 26,TXP,, | Performed by: RADIOLOGY

## 2023-05-23 PROCEDURE — 93978 US DOPP ILIACS BILATERAL: ICD-10-PCS | Mod: 26,TXP,, | Performed by: STUDENT IN AN ORGANIZED HEALTH CARE EDUCATION/TRAINING PROGRAM

## 2023-05-23 PROCEDURE — 72170 X-RAY EXAM OF PELVIS: CPT | Mod: TC,TXP

## 2023-05-23 RX ORDER — AMLODIPINE BESYLATE 2.5 MG/1
2.5 TABLET ORAL DAILY
COMMUNITY
Start: 2023-05-15 | End: 2023-12-21 | Stop reason: ALTCHOICE

## 2023-05-23 RX ORDER — INSULIN LISPRO 100 [IU]/ML
1-5 INJECTION, SOLUTION INTRAVENOUS; SUBCUTANEOUS
COMMUNITY

## 2023-05-23 NOTE — PROGRESS NOTES
Transplant Recipient Adult Psychosocial Assessment    [place] [id number]  Telephone Information:   Mobile [phone]   Home  [phone] (home)  Work  There is no work phone number on file.  E-mail  [email or web address]    Sex: female  YOB: 1960  Age: 62 y.o.    Encounter Date: 5/23/2023  [place] Citizen: yes  Primary Language: English   Needed: no    Emergency Contact:  Mellissa Says, 64 yo sisterLilo (pt lives with Mellissa and her  Sean), does drive/own car, works full time as  at home. [phone]    Family/Social Support:   Number of dependents/: pt denies  Marital history: pt reports is  10 years ago.  Other family dynamics: Pt reports large and supportive family. Pt reports is  10 years ago. Pt reports lives with highly supportive sister Mellissa Murillo and brother in law Sean Says in [place] and both will be assisting with transplant as caregivers. Pt reports  was in [employer] and patient has [employer] through his service. Pt reports having 5 supportive grown children who will assist with transplant as needed.  Pt's grown children:  Jorge A Shah, 46 yo son, [place]. [phone]  Javad Shah, 41 yo son, [place]. [phone]  Cresencio Shah, 38 yo son. [phone]  Rajiv Shah, 35 yo son, lives in [facility] and soon to be in [place], in [employer]. [phone]  Carolina Shah  Jeremy, 31 daughter, [place]. [phone]    Household Composition:  Mellissa Says, 64 yo sisterLilo, does drive/own car, works full time as  at home. [phone]  Sean Says, 67 yo brother in law, [place], does drive/own car, works full time as  at home. [phone]    Do you and your caregivers have access to reliable transportation? yes  PRIMARY CAREGIVER: Mellissa Says, sister, will be primary caregiver, phone number [phone]     provided in-depth information to  patient and caregiver regarding pre- and post-transplant caregiver role.   strongly encourages patient and caregiver to have concrete plan regarding post-transplant care giving, including back-up caregiver(s) to ensure care giving needs are met as needed.    Patient and Caregiver states understanding all aspects of caregiver role/commitment and is able/willing/committed to being caregiver to the fullest extent necessary.    Patient and Caregiver verbalizes understanding of the education provided today and caregiver responsibilities.         remains available. Patient and Caregiver agree to contact  in a timely manner if concerns arise.      Able to take time off work without financial concerns: yes.     Additional Significant Others who will Assist with Transplant:  Sean Keyss, 67 yo brother in law, [place], does drive/own car, works full time as  at home. [phone]  Cresencio Shah, 40 yo son, SAM, does drive/own car, works full time as assistant supervisor at [employer]. [phone]    Living Will: yes  Healthcare Power of : yes  Advance Directives on file: <<no information> per medical record.  Verbally reviewed LW/HCPA information.   provided patient with copy of LW/HCPA documents and provided education on completion of forms.    Living Donors: Education and resource information given to patient.    Highest Education Level: Associate/Bachelor Degree  Reading Ability: college  Reports difficulty with: reading and seeing due to blurred vision  Learns Best By:  multisensory     Status: no  VA Benefits: yes pt reports is . Does have  for life through 's [employer] service. Pt reports can get medicine through [facility] but they use [facility] and patient reports [facility] can be hard to use. Pt reports prefers to use [facility].     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 Pt christianne went back to school after working at [employer] and earned her BA in history and minor in Art.  Pt christianne never returned to work after getting her BA because her  became ill and she was his caregiver. Pt christianne is now . Reports  was in [employer]. Pt christianne receives 's  nursing home and from a [employer] annuity.    Spouse/Significant Other Employment: pt christianne is not  at this time.    Disabled: pt denies    Monthly Income:    government annuity: $1200     Social Security Widows Supplement $1300  Able to afford all costs now and if transplanted, including medications: yes  Patient and Caregiver verbalizes understanding of personal responsibilities related to transplant costs and the importance of having a financial plan to ensure that patients transplant costs are fully covered.       provided fundraising information/education. Patient and Caregiververbalizes understanding.   remains available.    Insurance:   Payer/Plan Subscr  Sex Relation Sub. Ins. ID Effective Group Num   1. MEDICARE - QUINTEN SZYMANSKI 1960 Female Self [id number]                                    [address]   2.  FOR LIVETTE LENZ JR. [birth year]/1957 Male Spouse [id number]                                    [address]     Primary Insurance (for UNOS reporting): Public Insurance - Medicare FFS (Fee For Service)  Secondary Insurance (for UNOS reporting): Public Insurance - Other Government  Patient and Caregiver verbalizes clear understanding that patient may experience difficulty obtaining and/or be denied insurance coverage post-surgery. This includes and is not limited to disability insurance, life insurance, health insurance, burial insurance, long term care insurance, and other insurances.       Patient and Caregiver also reports understanding that future health concerns related to or unrelated to transplantation may not be covered by patient's insurance.  Resources and information provided and reviewed.     Patient and Caregiver provides verbal permission to release any necessary information to outside resources for patient care and discharge planning.  Resources and information provided are reviewed.      [facility], [phone].  Hemodialysis: Tues, Thurs and Sat 3.5 hours    Dialysis Adherence: Patient and Caregiver reports high dialysis compliance with treatments and instructions within last 3 months.  5-2-23 dialysis compliance update is suitable.     Infusion Service: patient utilizing? no  Home Health: patient utilizing? yes [facility] 1 x every 2 weeks for vital sign checks.   DME: rollater, 3 prong cane, bpc, glucose monitor.  Pulmonary/Cardiac Rehab: pt reports having oxygen concentrator for breathing problems due to seasonal allergy asthma.   ADLS:  needs assistance with walking and has balance problems. Utilizes rollator and 3 prong cane for balance. Pt reports does not drive due to eyesight problems. Pt reports sister Mellissa and brother in law Sean provide all transportation, including for dialysis.    Adherence:  Adherence education and counseling provided.     Per History Section:  Past Medical History:   Diagnosis Date    Anemia, unspecified     Anxiety and depression     Arthritis     Asthma     Diabetes mellitus     Encounter for blood transfusion     ESRD (end stage renal disease)     dialysis TTS 1140 at [facility]    Hyperlipidemia     Hypertension     Obstructive uropathy     Panic attacks     Stroke      Social History     Tobacco Use    Smoking status: Never    Smokeless tobacco: Never   Substance Use Topics    Alcohol use: Not Currently     Social History     Substance and Sexual Activity   Drug Use Never     Social History     Substance and  "Sexual Activity   Sexual Activity Not on file       Per Today's Psychosocial:  Tobacco: none, patient denies any use.  Alcohol: none, patient denies any use.  Illicit Drugs/Non-prescribed Medications: none, patient denies any use.    Patient and Caregiver states clear understanding of the potential impact of substance use as it relates to transplant candidacy and is aware of possible random substance screening.  Substance abstinence/cessation counseling, education and resources provided and reviewed.     Arrests/DWI/Treatment/Rehab: patient denies    Psychiatric History:    Mental Health: Pt reports history of depression and anxiety due to "uncovered memories" and was in counseling for 10 years. Pt denies any mental health hospitalizations. Pt reports no longer in therapy as it "has run its course" and pt reports has learned and is using ways to cope well with feelings. Pt reports utilizes family support, aaliyah and prayer, exercises while seated, enjoys photography, reading and travel.  Pt reports PCP prescribes Ambien 5 mg for sleep and pt reports it does help. Pt denies any need for mental health referral at this time.  Psychiatrist/Counselor: pt denies  Medications:  Ambien 5 mg.  Suicide/Homicide Issues: pt denies any si/hi   Safety at home: Pt reports living in safe home environment with no abuse at this time    Knowledge: Patient and Caregiver states having clear understanding and realistic expectations regarding the potential risks and potential benefits of organ transplantation and organ donation and agrees to discuss with health care team members and support system members, as well as to utilize available resources and express questions and/or concerns in order to further facilitate the pt informed decision-making.  Resources and information provided and reviewed.    Patient and Caregiver is aware of [facility]'s affiliation and/or partnership with agencies in home health care, LTAC, SNF, DME, and other " hospitals and clinics.    Understanding: Patient and Caregiver reports having a clear understanding of the many lifetime commitments involved with being a transplant recipient, including costs, compliance, medications, lab work, procedures, appointments, concrete and financial planning, preparedness, timely and appropriate communication of concerns, abstinence (ETOH, tobacco, illicit non-prescribed drugs), adherence to all health care team recommendations, support system and caregiver involvement, appropriate and timely resource utilization and follow-through, mental health counseling as needed/recommended, and patient and caregiver responsibilities.  Social Service Handbook, resources and detailed educational information provided and reviewed.  Educational information provided.    Patient and Caregiver also reports current and expected compliance with health care regime and states having a clear understanding of the importance of compliance.      Patient and Caregiver reports a clear understanding that risks and benefits may be involved with organ transplantation and with organ donation.       Patient and Caregiver also reports clear understanding that psychosocial risk factors may affect patient, and include but are not limited to feelings of depression, generalized anxiety, anxiety regarding dependence on others, post traumatic stress disorder, feelings of guilt and other emotional and/or mental concerns, and/or exacerbation of existing mental health concerns.  Detailed resources provided and discussed.      Patient and Caregiver agrees to access appropriate resources in a timely manner as needed and/or as recommended, and to communicate concerns appropriately.  Patient and Caregiver also reports a clear understanding of treatment options available.     Patient and Caregiver received education in a group setting.   reviewed education, provided additional information, and answered  questions.    Feelings or Concerns: Pt reports high motivation to pursue kidney organ transplant at this time.     Coping: Identify Patient & Caregiver Strategies to [place]:   1. In the past, coping with major surgery and/or related stress - Pt reports utilizes family support, aaliyah and prayer, exercises while seated, enjoys photography, reading and travel.    2. Currently & Pre-transplant - Pt reports utilizes family support, aaliyah and prayer, exercises while seated, enjoys photography, reading and travel.    3. At the time of surgery - family support, aaliyah and prayer, reading   4. During post-Transplant & Recovery Period - family support, aaliyah and prayer, reading    Goals: Pt reports hope for successful kidney organ transplant so she can stop dialysis and have healthier life.  Patient referred to Vocational Rehabilitation.    Interview Behavior: Patient and Caregiver presents as alert and oriented x 4, pleasant, good eye contact, well groomed, recall good, concentration/judgement good, average intelligence, calm, communicative, cooperative, and asking and answering questions appropriately. Pt's highly supportive son Cresencio in session with patient's permission.          Transplant Social Work - Candidacy  Assessment/Plan:     Psychosocial Suitability: Patient presents as low risk candidate for kidney transplant at this time. Based on psychosocial risk factors, patient presents as low risk due to patient denying any psychosocial barriers to kidney organ transplant at this time. Pt reports having organ transplant caregiver/transportation plan, medical insurance plan and plan to afford transplant costs all in place.     Recommendations/Additional Comments: 5-2-23 dialysis compliance update shows suitable compliance. Pt reports lives away and will stay with son Cresencio for post transplant SAM stay. Pt reports plan to ask medical insurance about any transplant benefits for travel, meals and lodging.    SW recommends  that pt conduct fundraising to assist pt with pay for cost of medications, food, gas, and other transplant related needs.  SW recommends that pt remain aware of potential mental health concerns and contact the team if any concerns arise.  SW recommends that pt remain abstinent from tobacco, ETOH, and drug use.  SW supports pt's continued adherence. SW remains available to answer any questions or concerns that arise as the pt moves through the transplant process.      Final determination of transplant candidacy will be reviewed by the selection committee.      [facility]

## 2023-05-23 NOTE — PROGRESS NOTES
Transplant Nephrology  Kidney Transplant Recipient Evaluation    Referring Physician: Christian Abel  Current Nephrologist: Christian Abel    Subjective:   CC:  Initial evaluation of kidney transplant candidacy.    HPI:  Ms. Shah is a 62 y.o. year old White female who has presented to be evaluated as a potential kidney transplant recipient.  She has ESRD secondary to  (per medical record) acute kidney injury from obstructive uropathy and pyelonephritis, combined with underlying CKD from hypertensive versus diabetic nephropathy..  Patient is currently {dialysis history:74795}. She has a {access:61565} for dialysis access.     Previous Transplant: no    Fx assessment:        HX: obstructive uropathy and pyelonephritis  Following with Dr. Tanner with urology. Her ureteral stent was removed on Dec 16, 2022.    DM   Lab Results   Component Value Date    HGBA1C 5.5 10/27/2022       Kidney bx  Donor          Past Medical and Surgical History: Ms. Shah  has a past medical history of Anemia, unspecified, Anxiety and depression, Arthritis, Asthma, Diabetes mellitus, Encounter for blood transfusion, ESRD (end stage renal disease), Hyperlipidemia, Hypertension, Obstructive uropathy, Panic attacks, and Stroke.  She has a past surgical history that includes Ureteral stent placement; Cholecystectomy (2003); Foot surgery (2020); Eye surgery (Right); Tonsillectomy; Cataract extraction; Bilateral tubal ligation; and Tubal ligation.    Past Social and Family History: Ms. Shah reports that she has never smoked. She has never used smokeless tobacco. She reports that she does not currently use alcohol. She reports that she does not use drugs. Her family history includes Asthma in her mother; COPD in her father; Diabetes in her mother and sister; Heart disease in her mother; Hypertension in her sister; Kidney disease in her mother and sister; Leukemia in her brother; Lung cancer in her father; Stroke in her sister.    Review  "of Systems    Objective:   Blood pressure (!) 179/77, pulse 68, temperature 97.3 °F (36.3 °C), temperature source Tympanic, resp. rate 18, height 5' 5.32" (1.659 m), weight 73.2 kg (161 lb 6 oz), SpO2 97 %.body mass index is 26.6 kg/m².    Physical Exam    Labs:  Lab Results   Component Value Date    WBC 4.79 05/23/2023    HGB 8.1 (L) 05/23/2023    HCT 25.1 (L) 05/23/2023     04/28/2023    K 3.3 (L) 04/28/2023    CO2 32 (H) 04/28/2023    BUN 14.5 04/28/2023    CREATININE 2.35 (H) 04/28/2023    EGFRNORACEVR 23 04/28/2023    GLUCOSE 102 04/28/2023    CALCIUM 8.5 04/28/2023    PHOS 4.8 (H) 02/13/2023    MG 1.90 02/13/2023    ALBUMIN 3.7 04/28/2023    AST 16 04/28/2023    ALT 12 04/28/2023    .4 (H) 06/23/2020       Lab Results   Component Value Date    BILIRUBINUA Negative 01/18/2023    PROTEINUA 3+ (A) 01/18/2023    NITRITE Negative 07/16/2020    RBCUA 5 01/18/2023    WBCUA 306 (H) 01/18/2023       No results found for: HLAABCTYPE    Labs were reviewed with the patient.    Assessment:     1. Pre-transplant evaluation for chronic kidney disease    2. ESRD on hemodialysis    3. Anemia of chronic renal failure, stage 5    4. Type 2 diabetes mellitus with chronic kidney disease on chronic dialysis, with long-term current use of insulin    5. Renovascular hypertension    6. Hyperlipidemia, unspecified hyperlipidemia type    7. Chronic obstructive pulmonary disease, unspecified COPD type    8. Proteinuria, unspecified type    9. 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 Status post placement of ureteral stent        Plan:     Hx asthma:  Pulmonology clearance, PFTs (LOV 5/10/23)  Cleo Sy MD    [address] [facility]    [address] [facility] [place] [address] [phone]     HX: obstructive uropathy and pyelonephritis  Following with Dr. Tanner with urology--any concerns/get recs post txp     Transplant Candidacy:   Based on available information, Ms. Shah is " "{candidacy:32746} kidney transplant candidate.   Meets center eligibility for accepting HCV+ donor offer - Yes.  Patient educated on HCV+ donors. Nalini is willing to accept HCV+ donor offer - Yes   Patient is a candidate for KDPI > 85 kidney donor offer - Yes.  Final determination of transplant candidacy will be made once workup is complete and reviewed by the selection committee.    Patient advised that it is recommended that all transplant candidates, and their close contacts and household members receive Covid vaccination.    UNOS Patient Status  Functional Status: {Karnofsky Score:03598}    Diabetes: Type II   Age at Onset of Diabetes: ***  On insulin: yes  Date start insulin: ***  Total insulin units/day: ***  Retinopathy: {Yes / No / Unknown:74::"no"}  Neuropathy: {Yes / No / Unknown:74::"no"}  Ketoacidosis: unknown  Severe hypoglycemia (< 40 mg/dL): unknown  Hypoglycemic unawareness: {Yes / No / Unknown:74::"no"}  Amputation: {Yes / No / Unknown:74::"no"}  Symptomatic Peripheral Vascular Disease: {Yes / No / Unknown:74::"no"}    Any Previous Malignancy:  no   Breanna Seaman NP         "

## 2023-05-23 NOTE — PROGRESS NOTES
PRE-TRANSPLANT INFECTIOUS DISEASE CONSULT    Reason for Visit:  Pre-transplant evaluation  Referring Provider: Dr. Christian Abel     History of Present Illness:    62 y.o. female with a history of CKD presents for pre-kidney transplant evaluation.  She has been on HD since 3/2023.    Infectious History:  Recent hospital admissions: Yes - UTI at [facility]  -   Recent infections: Yes  Recent or current antibiotic use: No  History of recurrent infections *(sinus / pneumonia / UTI / SBP)*: Yes - UTI and 1 episode of pyelonephritis in 7/2022  Recent dental infections, issues or procedures: No  History of chicken pox: Yes  History of shingles: No  History of STI: No  History of COVID infection: Yes x 3    History of Immunosuppression:  Prior chemotherapy / immunosuppression: No  Prior transplant: No  History of splenectomy: No    Tuberculosis:  Prior screening for latent TB: No  Prior diagnosis of latent TB: No  Risk factors for TB *known exposure, incarceration, homelessness*: No    Geographical exposures:  Currently lives in [place], [place] with her sister and   Lived in the following states: Multiple  Lived or travelled to the [facility] [place]: Yes 2 weeks [place] ([place]) 5 yrs  International travel: Yes - [place] and [place]  Travel-associated illness: No    Social/Environmental:  Occupation:  Not working  Pets: Yes - Vaccines UTD  Livestock: No  Fishing / hunting: No  Hobbies: Photography/travel  Water: [place]  Consumption of raw/undercooked meat or seafood?  No  Tobacco: No  Alcohol: No  Recreational drug use:  No  Sexual partners: None      Past Histories:   Past Medical History:   Diagnosis Date    Anemia, unspecified     Anxiety and depression     Arthritis     Asthma     Diabetes mellitus     ESRD (end stage renal disease)     dialysis TTS 1140 at [facility]    Hypertension     Panic attacks     Stroke      Past Surgical History:   Procedure Laterality Date    BILATERAL TUBAL LIGATION       CATARACT EXTRACTION      CHOLECYSTECTOMY  2003    EYE SURGERY Right     muscle reattached- cosmetic    FOOT SURGERY  2020    TONSILLECTOMY      URETERAL STENT PLACEMENT       Family History   Problem Relation Age of Onset    Diabetes Mother     Heart disease Mother     Lung cancer Father     Stroke Sister     Leukemia Brother      Social History     Tobacco Use    Smoking status: Never    Smokeless tobacco: Never   Substance Use Topics    Alcohol use: Not Currently    Drug use: Never     Review of patient's allergies indicates:   Allergen Reactions    Iodine Shortness Of Breath     OKAY WITH TOPICAL PREPS.       Shellfish containing products Shortness Of Breath    Shellfish derived Shortness Of Breath    Amlodipine      Eye sight issues  Eye sight issues      Pioglitazone Nausea And Vomiting     Diarrhea & vomiting  Diarrhea & vomiting           Immunization History:  Received all childhood vaccines: Yes  All household members receive annual flu vaccine: Yes  All household members are up to date on COVID vaccine: Yes    Immunization History   Administered Date(s) Administered    COVID-19, MRNA, LN-S, PF (Pfizer) (Gray Cap) 05/14/2022    COVID-19, MRNA, LN-S, PF (Pfizer) (Purple Cap) 03/03/2021, 03/24/2021, 10/06/2021    COVID-19, mRNA, LNP-S, bivalent booster, PF (PFIZER OMICRON) 10/21/2022    Influenza - Quadrivalent - PF *Preferred* (6 months and older) 03/16/2019, 10/21/2022    Influenza - Trivalent - PF (ADULT) 12/30/2014    Pneumococcal Polysaccharide - 23 Valent 12/10/2012    Zoster Recombinant 05/14/2022          Current antibiotics:  Antibiotics (From admission, onward)      None              Review of Systems  Review of Systems   Constitutional: Negative for chills, decreased appetite, fever, malaise/fatigue, night sweats, weight gain and weight loss.   HENT:  Negative for congestion, ear pain, hearing loss, hoarse voice, sore throat and tinnitus.    Eyes:  Negative for blurred vision, redness and visual  disturbance.   Cardiovascular:  Negative for chest pain, leg swelling and palpitations.   Respiratory:  Negative for cough, hemoptysis, shortness of breath, sputum production and wheezing.    Endocrine: Negative for cold intolerance and heat intolerance.   Hematologic/Lymphatic: Negative for adenopathy. Does not bruise/bleed easily.   Skin:  Negative for dry skin, itching, rash and suspicious lesions.   Musculoskeletal:  Negative for back pain, joint pain, myalgias and neck pain.   Gastrointestinal:  Negative for abdominal pain, constipation, diarrhea, heartburn, nausea and vomiting.   Genitourinary:  Negative for dysuria, flank pain, frequency, hematuria, hesitancy and urgency.   Neurological:  Negative for dizziness, headaches, numbness, paresthesias and weakness.   Psychiatric/Behavioral:  Negative for depression and memory loss. The patient does not have insomnia and is not nervous/anxious.    Allergic/Immunologic: Negative for environmental allergies, HIV exposure, hives and persistent infections.        Objective  Physical Exam  Constitutional:       General: She is not in acute distress.     Appearance: Normal appearance. She is well-developed. She is not ill-appearing, toxic-appearing or diaphoretic.   HENT:      Head: Normocephalic and atraumatic.      Mouth/Throat:      Lips: Pink.      Mouth: No injury or oral lesions.      Dentition: Abnormal dentition. Dental caries (multiple cavitry and eroded teeth) present.      Tongue: Lesions present.   Cardiovascular:      Rate and Rhythm: Normal rate and regular rhythm.      Heart sounds: Normal heart sounds. No murmur heard.    No friction rub. No gallop.   Pulmonary:      Effort: Pulmonary effort is normal. No respiratory distress.      Breath sounds: Normal breath sounds. No wheezing or rales.   Abdominal:      General: Bowel sounds are normal. There is no distension.      Palpations: Abdomen is soft. There is no mass.      Tenderness: There is no abdominal  tenderness. There is no guarding or rebound.   Skin:     General: Skin is warm and dry.   Neurological:      Mental Status: She is alert and oriented to person, place, and time.   Psychiatric:         Behavior: Behavior normal.         Labs:    CBC:   Lab Results   Component Value Date    WBC 5.1 04/28/2023    HGB 8.0 (L) 04/28/2023    HCT 24.1 (L) 04/28/2023    MCV 88.0 04/28/2023     04/28/2023       Syphilis screening: No results found for: RPR, PRPQ, FTAABS     TB screening: No results found for: TBGOLDPLUS, TSPOTSCREN    HIV screening: No results found for: TUB24RQJL    Strongyloides IgG: No results found for: STRONGANTIGG    Hepatitis Serologies: No results found for: HEPAIGG, HEPBCAB, HEPBSAB, HEPBSURFABQU, HBSAG, HEPCAB     Varicella IgG: No results found for: VARICELLAINT      Assessment and Plan    1. Risks of Infection: Available serologies were reviewed. No unusual risks of infection or significant barriers to transplantation were identified from the infectious disease standpoint given the information available at this time. Rec urology eval for recurrent UTI.  It is recommended the patient have their teeth treated prior to transplant but should not delay transplant.     - Acute infectious issues: None   - Pending serologies: Quantiferon gold / T-spot, RPR, Strongyloides IgG, and VZV IgG   - Please call if any pending serologic testing is positive.    2. Immunizations:  Based on the patient's immunization history and serologies, the following immunizations are recommended:  - Hepatitis A    Patient does not have immunity to hepatitis A    Vaccination ordered today: Yes   - Hepatitis B    Patient does not have immunity to hepatitis B    Vaccination ordered today: Yes   - COVID    Current CDC vaccination recommendations were discussed with the patient   - Annual high dose influenza     Vaccination ordered today: No. Reason for not ordering: past season   - Prevnar 20    Vaccination ordered today:  Yes   - Tdap    Vaccination ordered today: Yes   - Shingrix    Vaccination ordered today: Yes    Recommended Pre-Transplant Immunization Schedule   Vaccine  0m 1m 2m 6m   Pneumococcal conjugate vaccine (Prevnar 20) X      Tetanus-diphtheria-pertussis (Tdap)* X      Hepatitis A Vaccine (Havrix)** X   X   Hepatitis B Vaccine (Heplisav)** X X     Influenza (annual) X      Zoster Recombinant Vaccine (Shingrix) X  X           *Administer booster every 10 years.       **Administer if no immunity demonstrated on serologies               Patient will receive vaccines at local pharmacy. A written prescription was provided for all vaccine doses.       3. Counseling:   I discussed with the patient the risk for increased susceptibility to infections following transplantation including increased risk for infection right after transplant and if rejection should occur.  The patient has been counseled on the importance of vaccinations to decrease risk of infection and severe illness. Specific guidance has been provided to the patient regarding the patient's occupation, hobbies and activities to avoid future infectious complications.     4. Transplant Candidacy: Based on available information, there are no identified significant barriers to transplantation from an infectious disease standpoint.  Final determination of transplant candidacy will be made once evaluation is complete and reviewed by the Selection Committee.    5. Vaccine and Serology Needs:  Hep A series  Heplisav B series  Prevnar 20  Shingrix - fu on 2nd dose  Tdap    Follow up with infectious disease as needed.       The total time for evaluation and management services performed on 05/23/2023 was greater than 45 minutes.

## 2023-05-23 NOTE — PROGRESS NOTES
PHARM.D. PRE-TRANSPLANT NOTE:    This patient's medication therapy was evaluated as part of her pre-transplant evaluation.      The following general pharmacologic concerns were noted: Aspirin will increase periop bleeding risk;     The following concerns for post-operative pain management were noted: N/A    The following pharmacologic concerns related to HCV therapy were noted: N/A      This patient's medication profile was reviewed for considerations for DAA Hepatitis C therapy:    [x]  No current inducers of CYP 3A4 or PGP  [x]  No amiodarone on this patient's EMR profile in the last 24 months  [x]  No past or current atrial fibrillation on this patient's EMR profile       Current Outpatient Medications   Medication Sig Dispense Refill    albuterol (PROVENTIL/VENTOLIN HFA) 90 mcg/actuation inhaler Inhale 2 puffs into the lungs as needed.      amLODIPine (NORVASC) 2.5 MG tablet Take 2.5 mg by mouth once daily.      aspirin (ECOTRIN) 81 MG EC tablet Take 1 tablet by mouth once daily.      carvediloL (COREG) 12.5 MG tablet Take 6.25 mg by mouth 2 (two) times daily.      cetirizine 10 mg chewable tablet Take 10 mg by mouth nightly.      cholecalciferol, vitamin D3, (VITAMIN D3) 250 mcg (10,000 unit) Cap capsule Take 10,000 Units by mouth every Tues, Thurs, Sat.      insulin glargine U-300 conc (TOUJEO MAX U-300 SOLOSTAR) 300 unit/mL (3 mL) insulin pen Inject 20 Units into the skin nightly.      insulin lispro 100 unit/mL injection Inject 1-5 Units into the skin 3 (three) times daily with meals. Sliding scale only      montelukast (SINGULAIR) 10 mg tablet Take 10 mg by mouth daily as needed.      pantoprazole (PROTONIX) 40 MG tablet Take 40 mg by mouth once daily.      sodium bicarbonate 650 MG tablet Take 650 mg by mouth 2 (two) times daily.      zolpidem (AMBIEN) 5 MG Tab Take 5 mg by mouth nightly.      ALLERGY RELIEF, FEXOFENADINE, 180 mg tablet Take 180 mg by mouth every morning.      folic acid (FOLVITE) 1 MG  tablet Take 1 mg by mouth once daily.      RETACRIT 20,000 unit/2 mL Soln On hold x 1 month       No current facility-administered medications for this visit.           I am available for consultation and can be contacted, as needed by the other members of the Transplant team.

## 2023-05-23 NOTE — PROGRESS NOTES
INITIAL PATIENT EDUCATION NOTE    Ms. Nalini Shah was seen in pre-kidney transplant clinic for evaluation for kidney, kidney/pancreas or pancreas only transplant.  The patient attended a an individual video education session that discussed/reviewed the following aspects of transplantation: evaluation including diagnostic and laboratory testing,( Chemistries, Hematology, Serologies including HIV and Hepatitis and HLA) required for transplantation and selection committee process, UNOS waitlist management/multiple listings, types of organs offered (KDPI < 85%, KDPI > 85%, PHS risk, DCD, HCV+, HIV+ for HIV+ recipients and enbloc/dual), financial aspects, surgical procedures, dietary instruction pre- and post-transplant, health maintenance pre- and post-transplant, post-transplant hospitalization and outpatient follow-up, potential to participate in a research protocol, and medication management and side effects.  A question and answer session was provided after the presentation.    The patient was seen by all members of the multi-disciplinary team to include: Nephrologist/REGGIE, Surgeon, , Transplant Coordinator, , Pharmacist and Dietician (if applicable).    The patient reviewed and signed all consents for evaluation which were witnessed and sent to scanning into the EPIC chart.    The patient was given an education book and plan for further evaluation based on her individual assessment.      Reviewed program requirement for complete COVID vaccination with documentation prior to listing.  COVID education information reviewed with patient. Patient encouraged to be up to date on all vaccinations.       The patient was informed that the transplant team would manage immediate post op pain. If the patient requires long term pain management, they will need to have that pain management addressed by their PCP or previous provider who wrote for long term pain medicines.    The patient was  encouraged to call with any questions or concerns.

## 2023-05-23 NOTE — LETTER
May 25, 2023        Christian Abel  [address]  [facility] [address]  Phone: [phone]  Fax: [phone]             [facility] 1st Fl  [address]  [facility] [address]  Phone: [phone]   Patient: Nalini Shah   MR Number: [id number]   YOB: 1960   Date of Visit: 5/23/2023       Dear Dr. Christian Abel    Thank you for referring Nalini Shah to me for evaluation. Attached you will find relevant portions of my assessment and plan of care.    If you have questions, please do not hesitate to call me. I look forward to following Nalini Shah along with you.    Sincerely,    Breanna Seaman, NP    Enclosure    If you would like to receive this communication electronically, please contact [email or web address] or [phone] to request Fonemesh Link access.    Fonemesh Link is a tool which provides read-only access to select patient information with whom you have a relationship. Its easy to use and provides real time access to review your patients record including encounter summaries, notes, results, and demographic information.    If you feel you have received this communication in error or would no longer like to receive these types of communications, please e-mail [email or web address]

## 2023-05-23 NOTE — TELEPHONE ENCOUNTER
Reviewed pt transplant labs.  Notified dialysis unit dietitian of the following abnormal labs via fax and requested their most recent nutrition note on this pt.  Once this note is received it will be scanned into pt's chart.     Glucose 136

## 2023-05-23 NOTE — PROGRESS NOTES
Transplant Nephrology  Kidney Transplant Recipient Evaluation    Referring Physician: Christian Abel  Current Nephrologist: Christian Abel    Subjective:   CC:  Initial evaluation of kidney transplant candidacy.     HPI:  Ms. Shah is a 62 y.o. year old White female who has presented to be evaluated as a potential kidney transplant recipient.  She has ESRD secondary to  (per medical record) acute kidney injury from obstructive uropathy and pyelonephritis, combined with underlying CKD from hypertensive versus diabetic nephropathy..  Patient is currently on hemodialysis started on 3/2023. Patient is dialyzing on MWF schedule.  Patient reports that she is tolerating dialysis well.. She has a dialysis catheter for dialysis access. She is dialyzing for 3 1/2 hours per session. Planning on switching to PD soon.     Previous Transplant: no    Fx assessment:   Uses a rolling walker most of the time d/t balance problems , Also will use a cane . In her home she does not use supportive devices. Reports dizziness and PIKE . Is able to do cooking and ADLs. Was able to transfer to exam table with some assistance, for safety, She has some LE weakness/balance problems with good upper body strength.   Pike not appear frail.     HX CVA -in 2014  Residual effects: no   Takes ASA 81 mg daily.       Reports ABN PET stress on 5/10/23 , Dr Bee/cardiologist has a angiogram scheduled on 5/26/23 at [facility]      HX: obstructive uropathy and pyelonephritis  Following with Dr. Tanner with urology. Her ureteral stent was removed on Dec 16, 2022.    DM: controlled on insulin   Lab Results   Component Value Date    HGBA1C 4.4 05/23/2023     Diabetes: Type II   Age at Onset of Diabetes: 1994  On insulin: yes  Date start insulin: 1998  Total insulin units/day: Toujeo 20 units Hs   Humalog ssi ~ 1x ~ 2 weeks needs to use ~ 4 units    Retinopathy: yes, has laser surgery and cataract surgery   Neuropathy: yes, toes /feet  "bilaterally   Ketoacidosis: unknown  Severe hypoglycemia (< 40 mg/dL): unknown  Hypoglycemic unawareness: yes  Amputation: no  Symptomatic Peripheral Vascular Disease: unknown    Any Previous Malignancy no        Kidney bx-no   Donor: yes     Past Medical and Surgical History: Ms. Sahh  has a past medical history of Anemia, unspecified, Anxiety and depression, Arthritis, Asthma, Diabetes mellitus, Encounter for blood transfusion, ESRD (end stage renal disease), Hyperlipidemia, Hypertension, Obstructive uropathy, Panic attacks, and Stroke.  She has a past surgical history that includes Ureteral stent placement; Cholecystectomy (2003); Foot surgery (2020); Eye surgery (Right); Tonsillectomy; Cataract extraction; Bilateral tubal ligation; and Tubal ligation.    Past Social and Family History: Ms. Shah reports that she has never smoked. She has never used smokeless tobacco. She reports that she does not currently use alcohol. She reports that she does not use drugs. Her family history includes Asthma in her mother; COPD in her father; Diabetes in her mother and sister; Heart disease in her mother; Hypertension in her sister; Kidney disease in her mother and sister; Leukemia in her brother; Lung cancer in her father; Stroke in her sister.    Review of Systems   Constitutional:  Positive for fatigue.   HENT:  Negative for voice change.    Eyes:  Positive for visual disturbance.   Gastrointestinal:  Positive for constipation.   Musculoskeletal:  Positive for arthralgias and gait problem (usea a rolling walker or cane for support).   Neurological:  Positive for dizziness (intermittent) and weakness (peripheral neuropathy).   Psychiatric/Behavioral:  Positive for sleep disturbance.         Hx Anxiety and depression      Objective:   Blood pressure (!) 179/77, pulse 68, temperature 97.3 °F (36.3 °C), temperature source Tympanic, resp. rate 18, height 5' 5.32" (1.659 m), weight 73.2 kg (161 lb 6 oz), SpO2 97 %.body mass " index is 26.6 kg/m².    Physical Exam  Vitals reviewed.   Constitutional:       Appearance: Normal appearance. She is well-developed.   HENT:      Head: Normocephalic.   Eyes:      Pupils: Pupils are equal, round, and reactive to light.   Cardiovascular:      Rate and Rhythm: Normal rate and regular rhythm.      Heart sounds: Normal heart sounds.   Pulmonary:      Effort: Pulmonary effort is normal.      Breath sounds: Normal breath sounds.   Chest:       Abdominal:      General: Bowel sounds are normal.      Palpations: Abdomen is soft.   Musculoskeletal:         General: Normal range of motion.      Cervical back: Normal range of motion and neck supple.   Skin:     General: Skin is warm and dry.   Neurological:      Mental Status: She is alert and oriented to person, place, and time.      Motor: Weakness present. No abnormal muscle tone.   Psychiatric:         Behavior: Behavior normal.       Labs:  Lab Results   Component Value Date    WBC 4.79 05/23/2023    HGB 8.1 (L) 05/23/2023    HCT 25.1 (L) 05/23/2023     05/23/2023    K 3.6 05/23/2023    CL 97 05/23/2023    CO2 30 (H) 05/23/2023    BUN 24 (H) 05/23/2023    CREATININE 2.9 (H) 05/23/2023    EGFRNORACEVR 17.6 (A) 05/23/2023    GLUCOSE 102 04/28/2023    CALCIUM 8.8 05/23/2023    PHOS 3.2 05/23/2023    MG 1.90 02/13/2023    ALBUMIN 3.7 05/23/2023    AST 13 05/23/2023    ALT 11 05/23/2023    .4 (H) 06/23/2020       Lab Results   Component Value Date    BILIRUBINUA Negative 01/18/2023    PROTEINUA 3+ (A) 01/18/2023    NITRITE Negative 07/16/2020    RBCUA 5 01/18/2023    WBCUA 306 (H) 01/18/2023       No results found for: HLAABCTYPE    Labs were reviewed with the patient.    Assessment:     1. Pre-transplant evaluation for chronic kidney disease    2. ESRD on hemodialysis    3. Anemia of chronic renal failure, stage 5    4. Type 2 diabetes mellitus with chronic kidney disease on chronic dialysis, with long-term current use of insulin    5.  Renovascular hypertension    6. Hyperlipidemia, unspecified hyperlipidemia type    7. Chronic obstructive pulmonary disease, unspecified COPD type    8. Proteinuria, unspecified type    9. 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 Status post placement of ureteral stent        Plan:   Hx asthma:  Pulmonology clearance, PFTs (LOV 5/10/23)  Cleo Sy MD    [address] [facility]    [address] [facility] [place] [address]    [phone]      HX: obstructive uropathy and pyelonephritis  Following with Dr. Tanner with urology--any concerns/get recs post txp     Cardiology clearance :   Reports ABN PET stress on 5/10/23 , Dr Bee/cardiologist has a angiogram scheduled on 5/26/23 at [facility] --get record and clearance    Fax labs to dialysis/nephrologist concerning   hypokalemia, electrolyte mgmt deferred     Transplant Candidacy:   Based on available information, Ms. Shah is a high-risk kidney transplant candidate.   Meets center eligibility for accepting HCV+ donor offer - Yes.  Patient educated on HCV+ donors. Nalini is willing to accept HCV+ donor offer - Yes   Patient is a candidate for KDPI > 85 kidney donor offer - Yes.  Final determination of transplant candidacy will be made once workup is complete and reviewed by the selection committee.    Patient advised that it is recommended that all transplant candidates, and their close contacts and household members receive Covid vaccination.    UNOS Patient Status  Functional Status: 60% - Requires occasional assistance but is able to care for needs  Breanna Seaman NP

## 2023-05-30 ENCOUNTER — TELEPHONE (OUTPATIENT)
Dept: TRANSPLANT | Facility: CLINIC | Age: 63
End: 2023-05-30
Payer: MEDICARE

## 2023-05-30 NOTE — TELEPHONE ENCOUNTER
"Patient stated that she's waiting for them to arrange the date for her bipass.   ----- Message from Antonio Cheema sent at 5/30/2023  1:34 PM CDT -----  Consult/Advisory:          Name Of Caller: Self      Contact Preference?: [phone]       What is the nature of the call?: Calling to speak w/ Sharrel. Stating that her early indication from angiogram is not looking good and she may be needing a double or triple by-pass         Additional Notes:  "Thank you for all that you do for our patients"      "

## 2023-05-31 ENCOUNTER — TELEPHONE (OUTPATIENT)
Dept: TRANSPLANT | Facility: CLINIC | Age: 63
End: 2023-05-31
Payer: MEDICARE

## 2023-05-31 ENCOUNTER — DOCUMENTATION ONLY (OUTPATIENT)
Dept: TRANSPLANT | Facility: CLINIC | Age: 63
End: 2023-05-31
Payer: MEDICARE

## 2023-05-31 DIAGNOSIS — Z76.82 ORGAN TRANSPLANT CANDIDATE: Primary | ICD-10-CM

## 2023-06-20 ENCOUNTER — TELEPHONE (OUTPATIENT)
Dept: GYNECOLOGY | Facility: CLINIC | Age: 63
End: 2023-06-20
Payer: MEDICARE

## 2023-06-20 NOTE — TELEPHONE ENCOUNTER
----- Message from Trent Goldstein MA sent at 6/20/2023  9:44 AM CDT -----  The attached pt needs a [facility] visit for kidney tx clearance. Please schedule pt on a T/Th.     Thanks  Trent  [id number]  Called patient and scheduled for Sept 20th, will mail as well, confirmed address.

## 2023-07-12 ENCOUNTER — LAB REQUISITION (OUTPATIENT)
Dept: LAB | Facility: HOSPITAL | Age: 63
End: 2023-07-12
Payer: MEDICARE

## 2023-07-12 DIAGNOSIS — E11.22 TYPE 2 DIABETES MELLITUS WITH DIABETIC CHRONIC KIDNEY DISEASE: ICD-10-CM

## 2023-07-12 LAB
EST. AVERAGE GLUCOSE BLD GHB EST-MCNC: 108.3 MG/DL
HBA1C MFR BLD: 5.4 %

## 2023-07-12 PROCEDURE — 83036 HEMOGLOBIN GLYCOSYLATED A1C: CPT | Mod: NTX | Performed by: FAMILY MEDICINE

## 2023-08-28 PROBLEM — N13.732: Status: RESOLVED | Noted: 2022-12-05 | Resolved: 2023-08-28

## 2023-09-07 ENCOUNTER — PATIENT MESSAGE (OUTPATIENT)
Dept: RESEARCH | Facility: HOSPITAL | Age: 63
End: 2023-09-07
Payer: MEDICARE

## 2023-09-25 ENCOUNTER — TELEPHONE (OUTPATIENT)
Dept: TRANSPLANT | Facility: CLINIC | Age: 63
End: 2023-09-25
Payer: MEDICARE

## 2023-10-09 ENCOUNTER — TELEPHONE (OUTPATIENT)
Dept: TRANSPLANT | Facility: CLINIC | Age: 63
End: 2023-10-09
Payer: MEDICARE

## 2023-10-09 NOTE — TELEPHONE ENCOUNTER
Spoke to patient about GYN appt, patient stated that she will find a outside doctor. Reminders sent in mail.

## 2023-10-10 ENCOUNTER — TELEPHONE (OUTPATIENT)
Dept: TRANSPLANT | Facility: CLINIC | Age: 63
End: 2023-10-10
Payer: MEDICARE

## 2023-10-11 ENCOUNTER — TELEPHONE (OUTPATIENT)
Dept: TRANSPLANT | Facility: CLINIC | Age: 63
End: 2023-10-11
Payer: MEDICARE

## 2023-10-11 DIAGNOSIS — N28.9 RENAL DISEASE: Primary | ICD-10-CM

## 2023-10-17 RX ORDER — CARVEDILOL 6.25 MG/1
1 TABLET ORAL
COMMUNITY
Start: 2023-03-23 | End: 2023-12-21 | Stop reason: ALTCHOICE

## 2023-10-17 RX ORDER — DIAZEPAM 10 MG/1
TABLET ORAL
COMMUNITY
End: 2023-12-21 | Stop reason: ALTCHOICE

## 2023-10-17 RX ORDER — ZOLPIDEM TARTRATE 5 MG/1
5 TABLET ORAL
COMMUNITY
Start: 2022-10-31 | End: 2023-10-20

## 2023-10-17 RX ORDER — ATORVASTATIN CALCIUM 10 MG/1
10 TABLET, FILM COATED ORAL NIGHTLY
COMMUNITY
Start: 2023-08-28

## 2023-10-17 RX ORDER — PRAMIPEXOLE DIHYDROCHLORIDE 1 MG/1
TABLET ORAL
COMMUNITY
End: 2023-12-21 | Stop reason: ALTCHOICE

## 2023-10-17 RX ORDER — ASPIRIN 81 MG/1
81 TABLET ORAL DAILY
COMMUNITY
Start: 2023-03-29

## 2023-10-17 RX ORDER — CETIRIZINE HYDROCHLORIDE 10 MG/1
10 TABLET ORAL DAILY
Status: ON HOLD | COMMUNITY
End: 2023-11-06 | Stop reason: HOSPADM

## 2023-10-27 ENCOUNTER — HOSPITAL ENCOUNTER (OUTPATIENT)
Dept: RADIOLOGY | Facility: HOSPITAL | Age: 63
Discharge: HOME | End: 2023-10-27
Attending: SURGERY
Payer: MEDICARE

## 2023-10-27 ENCOUNTER — OFFICE VISIT (OUTPATIENT)
Dept: SURGERY | Facility: CLINIC | Age: 63
End: 2023-10-27
Payer: MEDICARE

## 2023-10-27 VITALS
DIASTOLIC BLOOD PRESSURE: 74 MMHG | BODY MASS INDEX: 24.83 KG/M2 | TEMPERATURE: 99 F | WEIGHT: 149 LBS | SYSTOLIC BLOOD PRESSURE: 149 MMHG | HEART RATE: 69 BPM | HEIGHT: 65 IN

## 2023-10-27 DIAGNOSIS — Z01.818 PRE-OPERATIVE EXAMINATION: Primary | ICD-10-CM

## 2023-10-27 DIAGNOSIS — Z01.818 PRE-OPERATIVE EXAMINATION: ICD-10-CM

## 2023-10-27 DIAGNOSIS — N28.9 RENAL DISEASE: Primary | ICD-10-CM

## 2023-10-27 DIAGNOSIS — N28.9 RENAL DISEASE: ICD-10-CM

## 2023-10-27 PROCEDURE — 99204 OFFICE O/P NEW MOD 45 MIN: CPT | Mod: TXP,,, | Performed by: SURGERY

## 2023-10-27 PROCEDURE — 99204 PR OFFICE/OUTPT VISIT, NEW, LEVL IV, 45-59 MIN: ICD-10-PCS | Mod: TXP,,, | Performed by: SURGERY

## 2023-10-27 PROCEDURE — 71045 X-RAY EXAM CHEST 1 VIEW: CPT | Mod: TC,TXP

## 2023-10-27 NOTE — LETTER
AUTHORIZATION FOR RELEASE OF   CONFIDENTIAL INFORMATION    We are needing the following records on patient  Nalini Shah, date of birth 1960.        (X )  Recent Lab Results                                        (  )  COLONOSCOPY      (  )  Scans/Testing                                                 (  )  Operative Note(s)     (  )  Esophageal Manometry                                  (  )  OUTSIDE RESULTS         (  )  EGD and Pathology                                        (  )  ENTIRE RECORD     (X )  Recent Office Visit Note                                  (  )  _______________         Please fax records to  Dr. Felipe Jane at [phone].     If you have any questions, please contact our office at [phone].             Patient Name: Nalini Shah  : 1960 Patient Phone #: [phone]

## 2023-10-31 RX ORDER — METOPROLOL SUCCINATE 25 MG/1
25 TABLET, EXTENDED RELEASE ORAL EVERY MORNING
COMMUNITY
Start: 2023-07-24 | End: 2023-12-21 | Stop reason: ALTCHOICE

## 2023-10-31 RX ORDER — ZOLPIDEM TARTRATE 5 MG/1
5 TABLET ORAL
COMMUNITY
Start: 2023-10-20 | End: 2023-11-19

## 2023-11-02 ENCOUNTER — ANESTHESIA EVENT (OUTPATIENT)
Dept: SURGERY | Facility: HOSPITAL | Age: 63
End: 2023-11-02
Payer: MEDICARE

## 2023-11-03 ENCOUNTER — PATIENT MESSAGE (OUTPATIENT)
Dept: ADMINISTRATIVE | Facility: OTHER | Age: 63
End: 2023-11-03
Payer: MEDICARE

## 2023-11-04 ENCOUNTER — PATIENT MESSAGE (OUTPATIENT)
Dept: ADMINISTRATIVE | Facility: OTHER | Age: 63
End: 2023-11-04
Payer: MEDICARE

## 2023-11-05 ENCOUNTER — PATIENT MESSAGE (OUTPATIENT)
Dept: ADMINISTRATIVE | Facility: OTHER | Age: 63
End: 2023-11-05
Payer: MEDICARE

## 2023-11-06 ENCOUNTER — HOSPITAL ENCOUNTER (OUTPATIENT)
Facility: HOSPITAL | Age: 63
Discharge: HOME | End: 2023-11-06
Attending: SURGERY | Admitting: SURGERY
Payer: MEDICARE

## 2023-11-06 ENCOUNTER — ANESTHESIA (OUTPATIENT)
Dept: SURGERY | Facility: HOSPITAL | Age: 63
End: 2023-11-06
Payer: MEDICARE

## 2023-11-06 VITALS
BODY MASS INDEX: 23.88 KG/M2 | SYSTOLIC BLOOD PRESSURE: 171 MMHG | HEART RATE: 67 BPM | RESPIRATION RATE: 12 BRPM | OXYGEN SATURATION: 93 % | DIASTOLIC BLOOD PRESSURE: 71 MMHG | TEMPERATURE: 98 F | HEIGHT: 65 IN | WEIGHT: 143.31 LBS

## 2023-11-06 LAB
ANION GAP SERPL CALC-SCNC: 13 MEQ/L
BUN SERPL-MCNC: 32.2 MG/DL (ref 9.8–20.1)
CALCIUM SERPL-MCNC: 8.8 MG/DL (ref 8.4–10.2)
CHLORIDE SERPL-SCNC: 103 MMOL/L (ref 98–107)
CO2 SERPL-SCNC: 28 MMOL/L (ref 23–31)
CREAT SERPL-MCNC: 4.3 MG/DL (ref 0.55–1.02)
CREAT/UREA NIT SERPL: 7
GFR SERPLBLD CREATININE-BSD FMLA CKD-EPI: 11 MLS/MIN/1.73/M2
GLUCOSE SERPL-MCNC: 79 MG/DL (ref 82–115)
POCT GLUCOSE: 83 MG/DL (ref 70–110)
POCT GLUCOSE: 84 MG/DL (ref 70–110)
POTASSIUM SERPL-SCNC: 4.1 MMOL/L (ref 3.5–5.1)
SODIUM SERPL-SCNC: 144 MMOL/L (ref 136–145)

## 2023-11-06 PROCEDURE — 82962 GLUCOSE BLOOD TEST: CPT | Mod: TXP | Performed by: SURGERY

## 2023-11-06 PROCEDURE — 71000016 HC POSTOP RECOV ADDL HR: Mod: NTX | Performed by: SURGERY

## 2023-11-06 PROCEDURE — 27201423 OPTIME MED/SURG SUP & DEVICES STERILE SUPPLY: Mod: TXP | Performed by: SURGERY

## 2023-11-06 PROCEDURE — 49324 LAP INSERT TUNNEL IP CATH: CPT | Mod: NTX,,, | Performed by: SURGERY

## 2023-11-06 PROCEDURE — 37000009 HC ANESTHESIA EA ADD 15 MINS: Mod: NTX | Performed by: SURGERY

## 2023-11-06 PROCEDURE — 49324 PR LAP INSERTION TUNNELED INTRAPERITONEAL CATHETER: ICD-10-PCS | Mod: NTX,,, | Performed by: SURGERY

## 2023-11-06 PROCEDURE — 71000033 HC RECOVERY, INTIAL HOUR: Mod: NTX | Performed by: SURGERY

## 2023-11-06 PROCEDURE — 25000003 PHARM REV CODE 250: Mod: TXP | Performed by: NURSE ANESTHETIST, CERTIFIED REGISTERED

## 2023-11-06 PROCEDURE — C1750 CATH, HEMODIALYSIS,LONG-TERM: HCPCS | Mod: NTX | Performed by: SURGERY

## 2023-11-06 PROCEDURE — 37000008 HC ANESTHESIA 1ST 15 MINUTES: Mod: NTX | Performed by: SURGERY

## 2023-11-06 PROCEDURE — 63600175 PHARM REV CODE 636 W HCPCS: Mod: TXP | Performed by: SURGERY

## 2023-11-06 PROCEDURE — 25000003 PHARM REV CODE 250: Mod: TXP | Performed by: SURGERY

## 2023-11-06 PROCEDURE — D9220A PRA ANESTHESIA: ICD-10-PCS | Mod: CRNA,NTX,, | Performed by: NURSE ANESTHETIST, CERTIFIED REGISTERED

## 2023-11-06 PROCEDURE — 71000015 HC POSTOP RECOV 1ST HR: Mod: TXP | Performed by: SURGERY

## 2023-11-06 PROCEDURE — 63600175 PHARM REV CODE 636 W HCPCS: Mod: TXP | Performed by: NURSE ANESTHETIST, CERTIFIED REGISTERED

## 2023-11-06 PROCEDURE — D9220A PRA ANESTHESIA: Mod: ANES,NTX,, | Performed by: ANESTHESIOLOGY

## 2023-11-06 PROCEDURE — 25000003 PHARM REV CODE 250: Mod: TXP | Performed by: ANESTHESIOLOGY

## 2023-11-06 PROCEDURE — 80048 BASIC METABOLIC PNL TOTAL CA: CPT | Mod: NTX | Performed by: ANESTHESIOLOGY

## 2023-11-06 PROCEDURE — D9220A PRA ANESTHESIA: ICD-10-PCS | Mod: ANES,NTX,, | Performed by: ANESTHESIOLOGY

## 2023-11-06 PROCEDURE — D9220A PRA ANESTHESIA: Mod: CRNA,NTX,, | Performed by: NURSE ANESTHETIST, CERTIFIED REGISTERED

## 2023-11-06 PROCEDURE — 36000709 HC OR TIME LEV III EA ADD 15 MIN: Mod: NTX | Performed by: SURGERY

## 2023-11-06 PROCEDURE — 36000708 HC OR TIME LEV III 1ST 15 MIN: Mod: TXP | Performed by: SURGERY

## 2023-11-06 RX ORDER — HYDROMORPHONE HYDROCHLORIDE 2 MG/ML
0.4 INJECTION, SOLUTION INTRAMUSCULAR; INTRAVENOUS; SUBCUTANEOUS EVERY 5 MIN PRN
Status: DISCONTINUED | OUTPATIENT
Start: 2023-11-06 | End: 2023-11-06 | Stop reason: HOSPADM

## 2023-11-06 RX ORDER — PROPOFOL 10 MG/ML
VIAL (ML) INTRAVENOUS
Status: DISCONTINUED | OUTPATIENT
Start: 2023-11-06 | End: 2023-11-06

## 2023-11-06 RX ORDER — CEFAZOLIN SODIUM 1 G/3ML
1 INJECTION, POWDER, FOR SOLUTION INTRAMUSCULAR; INTRAVENOUS
Status: DISCONTINUED | OUTPATIENT
Start: 2023-11-06 | End: 2023-11-06 | Stop reason: HOSPADM

## 2023-11-06 RX ORDER — HEPARIN SODIUM 1000 [USP'U]/ML
INJECTION, SOLUTION INTRAVENOUS; SUBCUTANEOUS
Status: DISCONTINUED | OUTPATIENT
Start: 2023-11-06 | End: 2023-11-06 | Stop reason: HOSPADM

## 2023-11-06 RX ORDER — ROCURONIUM BROMIDE 10 MG/ML
INJECTION, SOLUTION INTRAVENOUS
Status: DISCONTINUED | OUTPATIENT
Start: 2023-11-06 | End: 2023-11-06

## 2023-11-06 RX ORDER — PHENYLEPHRINE HCL IN 0.9% NACL 1 MG/10 ML
SYRINGE (ML) INTRAVENOUS
Status: DISCONTINUED | OUTPATIENT
Start: 2023-11-06 | End: 2023-11-06

## 2023-11-06 RX ORDER — ACETAMINOPHEN 500 MG
1000 TABLET ORAL EVERY 8 HOURS
Status: DISCONTINUED | OUTPATIENT
Start: 2023-11-06 | End: 2023-11-06 | Stop reason: HOSPADM

## 2023-11-06 RX ORDER — MIDAZOLAM HYDROCHLORIDE 1 MG/ML
INJECTION INTRAMUSCULAR; INTRAVENOUS
Status: DISCONTINUED | OUTPATIENT
Start: 2023-11-06 | End: 2023-11-06

## 2023-11-06 RX ORDER — ACETAMINOPHEN 500 MG
1000 TABLET ORAL EVERY 8 HOURS PRN
Qty: 15 TABLET | Refills: 0 | Status: SHIPPED | OUTPATIENT
Start: 2023-11-06 | End: 2023-11-11

## 2023-11-06 RX ORDER — FENTANYL CITRATE 50 UG/ML
INJECTION, SOLUTION INTRAMUSCULAR; INTRAVENOUS
Status: DISCONTINUED | OUTPATIENT
Start: 2023-11-06 | End: 2023-11-06

## 2023-11-06 RX ORDER — ACETAMINOPHEN 500 MG
1000 TABLET ORAL EVERY 8 HOURS
Status: DISCONTINUED | OUTPATIENT
Start: 2023-11-07 | End: 2023-11-06

## 2023-11-06 RX ORDER — SODIUM CHLORIDE 0.9 % (FLUSH) 0.9 %
10 SYRINGE (ML) INJECTION
Status: DISCONTINUED | OUTPATIENT
Start: 2023-11-06 | End: 2023-11-06 | Stop reason: HOSPADM

## 2023-11-06 RX ORDER — BUPIVACAINE HYDROCHLORIDE 5 MG/ML
INJECTION, SOLUTION EPIDURAL; INTRACAUDAL
Status: DISCONTINUED | OUTPATIENT
Start: 2023-11-06 | End: 2023-11-06 | Stop reason: HOSPADM

## 2023-11-06 RX ORDER — LIDOCAINE HYDROCHLORIDE 10 MG/ML
1 INJECTION, SOLUTION EPIDURAL; INFILTRATION; INTRACAUDAL; PERINEURAL ONCE
Status: DISCONTINUED | OUTPATIENT
Start: 2023-11-06 | End: 2023-11-06 | Stop reason: HOSPADM

## 2023-11-06 RX ORDER — FENTANYL CITRATE 50 UG/ML
25 INJECTION, SOLUTION INTRAMUSCULAR; INTRAVENOUS EVERY 5 MIN PRN
Status: DISCONTINUED | OUTPATIENT
Start: 2023-11-06 | End: 2023-11-06 | Stop reason: HOSPADM

## 2023-11-06 RX ORDER — ONDANSETRON HYDROCHLORIDE 2 MG/ML
INJECTION, SOLUTION INTRAMUSCULAR; INTRAVENOUS
Status: DISCONTINUED | OUTPATIENT
Start: 2023-11-06 | End: 2023-11-06

## 2023-11-06 RX ORDER — GLYCOPYRROLATE 0.2 MG/ML
INJECTION INTRAMUSCULAR; INTRAVENOUS
Status: DISCONTINUED | OUTPATIENT
Start: 2023-11-06 | End: 2023-11-06

## 2023-11-06 RX ORDER — ACETAMINOPHEN 10 MG/ML
1000 INJECTION, SOLUTION INTRAVENOUS ONCE
Status: DISCONTINUED | OUTPATIENT
Start: 2023-11-06 | End: 2023-11-06 | Stop reason: HOSPADM

## 2023-11-06 RX ORDER — LIDOCAINE HYDROCHLORIDE 20 MG/ML
INJECTION, SOLUTION EPIDURAL; INFILTRATION; INTRACAUDAL; PERINEURAL
Status: DISCONTINUED | OUTPATIENT
Start: 2023-11-06 | End: 2023-11-06

## 2023-11-06 RX ORDER — DEXAMETHASONE SODIUM PHOSPHATE 4 MG/ML
INJECTION, SOLUTION INTRA-ARTICULAR; INTRALESIONAL; INTRAMUSCULAR; INTRAVENOUS; SOFT TISSUE
Status: DISCONTINUED | OUTPATIENT
Start: 2023-11-06 | End: 2023-11-06

## 2023-11-06 RX ORDER — FAMOTIDINE 10 MG/ML
20 INJECTION INTRAVENOUS ONCE
Status: COMPLETED | OUTPATIENT
Start: 2023-11-06 | End: 2023-11-06

## 2023-11-06 RX ADMIN — Medication 100 MCG: at 12:11

## 2023-11-06 RX ADMIN — GLYCOPYRROLATE 0.1 MG: 0.2 INJECTION INTRAMUSCULAR; INTRAVENOUS at 12:11

## 2023-11-06 RX ADMIN — DEXAMETHASONE SODIUM PHOSPHATE 4 MG: 4 INJECTION, SOLUTION INTRA-ARTICULAR; INTRALESIONAL; INTRAMUSCULAR; INTRAVENOUS; SOFT TISSUE at 12:11

## 2023-11-06 RX ADMIN — ACETAMINOPHEN 1000 MG: 500 TABLET ORAL at 03:11

## 2023-11-06 RX ADMIN — SODIUM CHLORIDE: 9 INJECTION, SOLUTION INTRAVENOUS at 12:11

## 2023-11-06 RX ADMIN — FENTANYL CITRATE 100 MCG: 50 INJECTION, SOLUTION INTRAMUSCULAR; INTRAVENOUS at 12:11

## 2023-11-06 RX ADMIN — LIDOCAINE HYDROCHLORIDE 80 MG: 20 INJECTION, SOLUTION EPIDURAL; INFILTRATION; INTRACAUDAL; PERINEURAL at 12:11

## 2023-11-06 RX ADMIN — MIDAZOLAM HYDROCHLORIDE 2 MG: 1 INJECTION, SOLUTION INTRAMUSCULAR; INTRAVENOUS at 12:11

## 2023-11-06 RX ADMIN — ROCURONIUM BROMIDE 10 MG: 10 SOLUTION INTRAVENOUS at 12:11

## 2023-11-06 RX ADMIN — ONDANSETRON 4 MG: 2 INJECTION INTRAMUSCULAR; INTRAVENOUS at 01:11

## 2023-11-06 RX ADMIN — ROCURONIUM BROMIDE 40 MG: 10 SOLUTION INTRAVENOUS at 12:11

## 2023-11-06 RX ADMIN — SUGAMMADEX 150 MG: 100 INJECTION, SOLUTION INTRAVENOUS at 01:11

## 2023-11-06 RX ADMIN — CEFAZOLIN 1 G: 330 INJECTION, POWDER, FOR SOLUTION INTRAMUSCULAR; INTRAVENOUS at 12:11

## 2023-11-06 RX ADMIN — Medication 50 MCG: at 01:11

## 2023-11-06 RX ADMIN — FAMOTIDINE 20 MG: 10 INJECTION, SOLUTION INTRAVENOUS at 12:11

## 2023-11-06 RX ADMIN — PROPOFOL 120 MG: 10 INJECTION, EMULSION INTRAVENOUS at 12:11

## 2023-11-06 NOTE — DISCHARGE INSTRUCTIONS
-NO driving and NO alcohol consumption for 24 hours and while taking narcotic pain medications.    -only use pd catheter when cleared by nephrology    -Monitor sites for infection: redness, swelling, drainage/pus/foul odor, fever, chills.    -Report to your nearest ER if you experience and/or notify your provider if you experience any SUDDEN/SEVERE chest/abdominal pain, weakness, trouble breathing, uncontrolled pain.    BLEEDING: If surgical site begins to bleed , contact your doctor or go to ER    NAUSEA: due to the anesthesia, you may experience nausea for up to 24 hours. If nausea and vomiting last longer, contact your doctor.     INFECTION:  watch for any signs or symptoms of infection such as chills, fever, redness or drainage at surgical site. Notify your doctor     PAIN : take your pain medications as directed. If the pain medications are not helping, notify your doctor.

## 2023-11-06 NOTE — TRANSFER OF CARE
"Anesthesia Transfer of Care Note    Patient: Nalini Shah    Procedure(s) Performed: Procedure(s) (LRB):  INSERTION, CATHETER, DIALYSIS, PERITONEAL, LAPAROSCOPIC (N/A)    Patient location: PACU    Anesthesia Type: general    Transport from OR: Transported from OR on room air with adequate spontaneous ventilation    Post pain: adequate analgesia    Post assessment: no apparent anesthetic complications and tolerated procedure well    Post vital signs: stable    Level of consciousness: responds to stimulation    Nausea/Vomiting: no nausea/vomiting    Complications: none    Transfer of care protocol was followed      Last vitals:   Visit Vitals  BP (!) 151/78   Pulse 64   Temp 36.7 °C (98 °F) (Oral)   Resp 18   Ht 5' 5" (1.651 m)   Wt 65 kg (143 lb 4.8 oz)   SpO2 (!) 94%   Breastfeeding No   BMI 23.85 kg/m²     "

## 2023-11-06 NOTE — DISCHARGE SUMMARY
[facility] - Periop Services  Discharge Note  Short Stay    Procedure(s) (LRB):  INSERTION, CATHETER, DIALYSIS, PERITONEAL, LAPAROSCOPIC (N/A)      OUTCOME: Condition has improved and patient is now ready for discharge.    DISPOSITION: Home or Self Care    FINAL DIAGNOSIS:  End Stage renal disease Stage IV, on hemodialysis    FOLLOWUP: In clinic    DISCHARGE INSTRUCTIONS:  only use pd catheter when cleared by nephrology    TIME SPENT ON DISCHARGE: 5 minutes

## 2023-11-06 NOTE — ANESTHESIA POSTPROCEDURE EVALUATION
Anesthesia Post Evaluation    Patient: Nalini Shah    Procedure(s) Performed: Procedure(s) (LRB):  INSERTION, CATHETER, DIALYSIS, PERITONEAL, LAPAROSCOPIC (N/A)    Final Anesthesia Type: general      Patient location during evaluation: PACU  Patient participation: Yes- Able to Participate  Level of consciousness: awake and alert  Post-procedure vital signs: reviewed and stable  Pain management: adequate  Airway patency: patent    PONV status at discharge: No PONV  Anesthetic complications: no      Cardiovascular status: blood pressure returned to baseline  Respiratory status: spontaneous ventilation and unassisted  Hydration status: euvolemic  Follow-up needed           Vitals Value Taken Time   /60 11/06/23 1351   Temp 36.8 °C (98.2 °F) 11/06/23 1330   Pulse 62 11/06/23 1359   Resp 12 11/06/23 1359   SpO2 92 % 11/06/23 1359   Vitals shown include unvalidated device data.      No case tracking events are documented in the log.      Pain/Jordan Score: Jordan Score: 8 (11/6/2023  1:34 PM)

## 2023-11-06 NOTE — PROGRESS NOTES
Patient ID: [id number]     Chief Complaint: Consult (PD cath placement)      HPI:     Nalini Shah is a 63 y.o. female here today for possible placement of peritoneal dialysis catheter evaluation.  She is on hemodialysis currently.  She would prefer peritoneal dialysis, for which reason she was referred to me.       Past Medical History:   Diagnosis Date    Acute pyelonephritis     Anemia, unspecified     Anxiety and depression     Arthritis     Asthma     Asthma due to seasonal allergies     At risk for infection     Chronic obstructive lung disease     Coronary artery disease     Diabetes mellitus     Dialysis patient     Encounter for blood transfusion     ESRD (end stage renal disease)     dialysis TTS 1140 at [facility]    Hyperkalemia     Hyperlipidemia     Hypertension     Insomnia     Metabolic acidosis     Obstructive uropathy     Panic attacks     Proteinuria     Restless leg     Stroke     Urinary tract infection, site not specified         Past Surgical History:   Procedure Laterality Date    AV FISTULA PLACEMENT  08/31/2023    BILATERAL TUBAL LIGATION      CARDIAC SURGERY  06/15/2023    TRIPLE BYPASS    CATARACT EXTRACTION      CHOLECYSTECTOMY  2003    COLONOSCOPY      EYE SURGERY Right     muscle reattached- cosmetic    FOOT SURGERY  2020    INSERTION OF DIALYSIS CATHETER  03/2023    TONSILLECTOMY      TUBAL LIGATION      URETERAL STENT PLACEMENT          Social History     Tobacco Use    Smoking status: Never    Smokeless tobacco: Never   Substance and Sexual Activity    Alcohol use: Not Currently    Drug use: Never    Sexual activity: Not on file        Current Outpatient Medications   Medication Instructions    albuterol (PROVENTIL/VENTOLIN HFA) 90 mcg/actuation inhaler 2 puffs, Inhalation, As needed (PRN)    ALLERGY RELIEF (FEXOFENADINE) 180 mg, Oral, Every morning    amLODIPine (NORVASC) 2.5 mg, Oral, Daily    aspirin (ECOTRIN) 81 mg, Oral, Daily    atorvastatin (LIPITOR) 10 mg, Oral,  Nightly    carvediloL (COREG) 6.25 MG tablet 1 tablet, Oral    carvediloL (COREG) 6.25 mg, Oral, 2 times daily    cetirizine (ZYRTEC) 10 mg, Oral, Daily    cetirizine 10 mg, Oral, Nightly    diazePAM (VALIUM) 10 MG Tab No dose, route, or frequency recorded.    folic acid (FOLVITE) 1 mg, Oral, Daily    insulin lispro 1-5 Units, Subcutaneous, 3 times daily with meals, Sliding scale only     methoxy peg-epoetin beta (MIRCERA INJ) 225 mcg    metoprolol succinate (TOPROL-XL) 25 mg, Oral, Every morning    pantoprazole (PROTONIX) 40 mg, Oral, Daily    pramipexole (MIRAPEX) 1 MG tablet No dose, route, or frequency recorded.    RETACRIT 20,000 unit/2 mL Soln On hold x 1 month    sodium bicarbonate 650 mg, Oral, 2 times daily    TOUJEO MAX U-300 SOLOSTAR 20 Units, Subcutaneous, Nightly    zolpidem (AMBIEN) 5 mg, Oral       Review of patient's allergies indicates:   Allergen Reactions    Iodine Shortness Of Breath     OKAY WITH TOPICAL PREPS.       Shellfish containing products Shortness Of Breath    Shellfish derived Shortness Of Breath    Amlodipine      Eye sight issues  Eye sight issues      Pioglitazone Nausea And Vomiting     Diarrhea & vomiting  Diarrhea & vomiting          Patient Care Team:  Dario Stanley DO as PCP - General (Family Medicine)  Felipe Jane MD as Surgeon (General Surgery)  Miguel Abebe MD (Nephrology)  Cuong Muller MD (Cardiology)  Cleo Sy MD (Pulmonary Disease)     Subjective:     Review of Systems   Constitutional:  Negative for activity change, appetite change and chills.   HENT:  Negative for congestion, dental problem, drooling and ear discharge.    Eyes:  Negative for pain and redness.   Respiratory:  Negative for apnea, cough, choking and chest tightness.    Cardiovascular:  Negative for chest pain, palpitations and leg swelling.   Gastrointestinal:  Negative for abdominal distention, abdominal pain and anal bleeding.   Endocrine: Negative for cold intolerance and heat  "intolerance.   Genitourinary:  Negative for difficulty urinating, dyspareunia, enuresis and flank pain.   Neurological:  Negative for dizziness, tremors, seizures, syncope, speech difficulty, weakness and numbness.   Hematological:  Negative for adenopathy. Does not bruise/bleed easily.   Psychiatric/Behavioral:  Negative for agitation, behavioral problems and confusion.        12 point review of systems conducted, negative except as stated in the history of present illness. See HPI for details.    Objective:     Visit Vitals  BP (!) 149/74   Pulse 69   Temp 98.8 °F (37.1 °C)   Ht 5' 5" (1.651 m)   Wt 67.6 kg (149 lb)   BMI 24.79 kg/m²       Physical Exam  Constitutional:       Appearance: Normal appearance.   HENT:      Head: Normocephalic and atraumatic.   Eyes:      Extraocular Movements: Extraocular movements intact.      Conjunctiva/sclera: Conjunctivae normal.      Pupils: Pupils are equal, round, and reactive to light.   Cardiovascular:      Rate and Rhythm: Bradycardia present.   Pulmonary:      Effort: Pulmonary effort is normal. No respiratory distress.      Breath sounds: Normal breath sounds. No wheezing or rales.   Abdominal:      General: Abdomen is flat. There is no distension.      Palpations: Abdomen is soft. There is no mass.      Tenderness: There is no abdominal tenderness. There is no guarding or rebound.      Hernia: No hernia is present.   Musculoskeletal:      Cervical back: Normal range of motion and neck supple.   Neurological:      General: No focal deficit present.      Mental Status: She is alert and oriented to person, place, and time. Mental status is at baseline.      Cranial Nerves: No cranial nerve deficit.      Sensory: No sensory deficit.      Motor: No weakness.      Coordination: Coordination normal.      Gait: Gait normal.   Psychiatric:         Mood and Affect: Mood normal.         Behavior: Behavior normal.         Assessment:       ICD-10-CM ICD-9-CM   1. Pre-operative " examination  Z01.818 V72.84   2. Renal disease  N28.9 593.9        Plan:     1. Pre-operative examination  -     X-Ray Chest 1 View; Future; Expected date: 10/27/2023  -     EKG 12-lead; Future; Expected date: 10/27/2023  -     Comprehensive Metabolic Panel; Future; Expected date: 10/27/2023  -     CBC Auto Differential; Future; Expected date: 10/27/2023    2. Renal disease  -     Ambulatory referral/consult to General Surgery     Will schedule for lap placement of peritoneal dialysis catheter on 11-6-2023    No follow-ups on file. In addition to their scheduled follow up, the patient has also been instructed to follow up on as needed basis.     Future Appointments   Date Time Provider Department Center   12/8/2023 10:15 AM Felipe Jane MD [facility] [place]    5/6/2024 10:15 AM [facility] G[facility] [place] Jane MD

## 2023-11-06 NOTE — H&P
HPI:      Nalini Shah is a 63 y.o. female here today for possible placement of peritoneal dialysis catheter evaluation.  She is on hemodialysis currently.  She would prefer peritoneal dialysis, for which reason she was referred to me.              Past Medical History:   Diagnosis Date    Acute pyelonephritis      Anemia, unspecified      Anxiety and depression      Arthritis      Asthma      Asthma due to seasonal allergies      At risk for infection      Chronic obstructive lung disease      Coronary artery disease      Diabetes mellitus      Dialysis patient      Encounter for blood transfusion      ESRD (end stage renal disease)       dialysis TTS 1140 at [facility]    Hyperkalemia      Hyperlipidemia      Hypertension      Insomnia      Metabolic acidosis      Obstructive uropathy      Panic attacks      Proteinuria      Restless leg      Stroke      Urinary tract infection, site not specified                 Past Surgical History:   Procedure Laterality Date    AV FISTULA PLACEMENT   08/31/2023    BILATERAL TUBAL LIGATION        CARDIAC SURGERY   06/15/2023     TRIPLE BYPASS    CATARACT EXTRACTION        CHOLECYSTECTOMY   2003    COLONOSCOPY        EYE SURGERY Right       muscle reattached- cosmetic    FOOT SURGERY   2020    INSERTION OF DIALYSIS CATHETER   03/2023    TONSILLECTOMY        TUBAL LIGATION        URETERAL STENT PLACEMENT             Social History           Tobacco Use    Smoking status: Never    Smokeless tobacco: Never   Substance and Sexual Activity    Alcohol use: Not Currently    Drug use: Never    Sexual activity: Not on file              Current Outpatient Medications   Medication Instructions    albuterol (PROVENTIL/VENTOLIN HFA) 90 mcg/actuation inhaler 2 puffs, Inhalation, As needed (PRN)    ALLERGY RELIEF (FEXOFENADINE) 180 mg, Oral, Every morning    amLODIPine (NORVASC) 2.5 mg, Oral, Daily    aspirin (ECOTRIN) 81 mg, Oral, Daily    atorvastatin (LIPITOR) 10 mg, Oral,  Nightly    carvediloL (COREG) 6.25 MG tablet 1 tablet, Oral    carvediloL (COREG) 6.25 mg, Oral, 2 times daily    cetirizine (ZYRTEC) 10 mg, Oral, Daily    cetirizine 10 mg, Oral, Nightly    diazePAM (VALIUM) 10 MG Tab No dose, route, or frequency recorded.    folic acid (FOLVITE) 1 mg, Oral, Daily    insulin lispro 1-5 Units, Subcutaneous, 3 times daily with meals, Sliding scale only     methoxy peg-epoetin beta (MIRCERA INJ) 225 mcg    metoprolol succinate (TOPROL-XL) 25 mg, Oral, Every morning    pantoprazole (PROTONIX) 40 mg, Oral, Daily    pramipexole (MIRAPEX) 1 MG tablet No dose, route, or frequency recorded.    RETACRIT 20,000 unit/2 mL Soln On hold x 1 month    sodium bicarbonate 650 mg, Oral, 2 times daily    TOUJEO MAX U-300 SOLOSTAR 20 Units, Subcutaneous, Nightly    zolpidem (AMBIEN) 5 mg, Oral               Review of patient's allergies indicates:   Allergen Reactions    Iodine Shortness Of Breath       OKAY WITH TOPICAL PREPS.        Shellfish containing products Shortness Of Breath    Shellfish derived Shortness Of Breath    Amlodipine         Eye sight issues  Eye sight issues       Pioglitazone Nausea And Vomiting       Diarrhea & vomiting  Diarrhea & vomiting            Patient Care Team:  Dario Stanley DO as PCP - General (Family Medicine)  Felipe Jane MD as Surgeon (General Surgery)  Miguel Abebe MD (Nephrology)  Cuong Muller MD (Cardiology)  Cleo Sy MD (Pulmonary Disease)      Subjective:      Review of Systems   Constitutional:  Negative for activity change, appetite change and chills.   HENT:  Negative for congestion, dental problem, drooling and ear discharge.    Eyes:  Negative for pain and redness.   Respiratory:  Negative for apnea, cough, choking and chest tightness.    Cardiovascular:  Negative for chest pain, palpitations and leg swelling.   Gastrointestinal:  Negative for abdominal distention, abdominal pain and anal bleeding.   Endocrine: Negative for cold  "intolerance and heat intolerance.   Genitourinary:  Negative for difficulty urinating, dyspareunia, enuresis and flank pain.   Neurological:  Negative for dizziness, tremors, seizures, syncope, speech difficulty, weakness and numbness.   Hematological:  Negative for adenopathy. Does not bruise/bleed easily.   Psychiatric/Behavioral:  Negative for agitation, behavioral problems and confusion.          12 point review of systems conducted, negative except as stated in the history of present illness. See HPI for details.     Objective:      Visit Vitals  BP (!) 149/74   Pulse 69   Temp 98.8 °F (37.1 °C)   Ht 5' 5" (1.651 m)   Wt 67.6 kg (149 lb)   BMI 24.79 kg/m²         Physical Exam  Constitutional:       Appearance: Normal appearance.   HENT:      Head: Normocephalic and atraumatic.   Eyes:      Extraocular Movements: Extraocular movements intact.      Conjunctiva/sclera: Conjunctivae normal.      Pupils: Pupils are equal, round, and reactive to light.   Cardiovascular:      Rate and Rhythm: Bradycardia present.   Pulmonary:      Effort: Pulmonary effort is normal. No respiratory distress.      Breath sounds: Normal breath sounds. No wheezing or rales.   Abdominal:      General: Abdomen is flat. There is no distension.      Palpations: Abdomen is soft. There is no mass.      Tenderness: There is no abdominal tenderness. There is no guarding or rebound.      Hernia: No hernia is present.   Musculoskeletal:      Cervical back: Normal range of motion and neck supple.   Neurological:      General: No focal deficit present.      Mental Status: She is alert and oriented to person, place, and time. Mental status is at baseline.      Cranial Nerves: No cranial nerve deficit.      Sensory: No sensory deficit.      Motor: No weakness.      Coordination: Coordination normal.      Gait: Gait normal.   Psychiatric:         Mood and Affect: Mood normal.         Behavior: Behavior normal.            Assessment:          ICD-10-CM " ICD-9-CM   1. Pre-operative examination  Z01.818 V72.84   2. Renal disease  N28.9 593.9         Plan:      1. Pre-operative examination  -     X-Ray Chest 1 View; Future; Expected date: 10/27/2023  -     EKG 12-lead; Future; Expected date: 10/27/2023  -     Comprehensive Metabolic Panel; Future; Expected date: 10/27/2023  -     CBC Auto Differential; Future; Expected date: 10/27/2023     2. Renal disease  -     Ambulatory referral/consult to General Surgery      Will schedule for lap placement of peritoneal dialysis catheter on 11-6-2023     No follow-ups on file. In addition to their scheduled follow up, the patient has also been instructed to follow up on as needed basis.

## 2023-11-06 NOTE — ANESTHESIA PROCEDURE NOTES
Intubation    Date/Time: 11/6/2023 12:31 PM    Performed by: Laura Paz CRNA  Authorized by: Javad Burns DO    Intubation:     Induction:  Intravenous    Intubated:  Postinduction    Mask Ventilation:  Easy mask    Attempts:  1    Attempted By:  CRNA    Method of Intubation:  Video laryngoscopy    Blade:  Greene 3    Laryngeal View Grade: Grade I - full view of cords      Difficult Airway Encountered?: No      Complications:  None    Airway Device:  Oral endotracheal tube    Airway Device Size:  7.0    Style/Cuff Inflation:  Cuffed (inflated to minimal occlusive pressure)    Tube secured:  21    Secured at:  The lips    Placement Verified By:  Capnometry    Complicating Factors:  None    Findings Post-Intubation:  BS equal bilateral and atraumatic/condition of teeth unchanged

## 2023-11-06 NOTE — OP NOTE
Surgeon:  Felipe Jane MD     Date of operation:  November 6, 2023     Operation: Laparoscopic placement of peritoneal dialysis catheter, laparoscopic omental pexy     Indications:  63-year-old female on hemodialysis, she has end-stage renal disease but would prefer peritoneal dialysis.  She was referred to me for placement of peritoneal dialysis catheter     Preop diagnosis: End-stage renal disease, on hemodialysis, end-stage renal disease stage 5     Postop diagnosis:  Same     Complications:  None     Findings:  Omentum located within the pelvis     Anesthesia: General endotracheal     Specimens: None     Blood loss:  3 cc     Disposition:  Stable satisfactory     Details of procedure:  Patient was brought to the operating room laid supine on the operating table, general anesthesia was administered she was intubated endotracheally     The abdomen is prepped draped in usual sterile fashion, a 5 mm Optiview trocar was placed pneumoperitoneum was achieved, a 5 mm port was placed in the left flank, attention was turned towards the pelvis, the patient had a large bit of omentum that was within the pelvis, decided to perform an omental pexy to prevent the omentum from occluding the peritoneal dialysis catheter, a laparoscopic needle Passer was used to ensnare the omentum within a loop of 0 silk suture, the omentum was tied up into the epigastrium with the assistance of the laparoscopic suture Passer, it was sutured into the epigastrium suture was cut, the omentum was removed from the pelvis up into the epigastrium where she would not encumber the peritoneal dialysis catheter     3 cm right and lateral to the umbilicus, a 5 mm stab incision was made through which a plastic peel-away trocar was introduced at a 45 degree angle towards the pelvis, through this peel-away trocar dual tissue cuffed coiled tip peritoneal dialysis catheter was placed, the distal cuff was left within the rectus abdominis muscle and the coiled  tip was placed in the pelvis, the proximal tissue cuff was tunneled through the subcutaneous tissue and was pulled through an exit site in the patient's left upper quadrant     All ports removed under direct laparoscopic vision, the proper apparatus was hooked up to the peritoneal dialysis catheter was flushed with straight heparin to prevent early clotting     All skin incisions were closed with 4-0 subcuticular Vicryl sutures and sterile dressings     The catheter was covered with clean dressings     Patient tolerated procedure well, anesthesia placed extubated bring the PACU in stable satisfactory condition

## 2023-11-06 NOTE — ANESTHESIA PREPROCEDURE EVALUATION
11/06/2023  Nalini Shah is a 63 y.o., female.      Pre-op Assessment    I have reviewed the Patient Summary Reports.     I have reviewed the Nursing Notes. I have reviewed the NPO Status.   I have reviewed the Medications.     Review of Systems  Anesthesia Hx:  No problems with previous Anesthesia    Social:  Non-Smoker    Cardiovascular:   Hypertension CAD  CABG/stent (CABG)   Denies Angina.  Denies CHF. hyperlipidemia    Pulmonary:   COPD, mild Asthma mild    Renal/:   Chronic Renal Disease, ESRD, Dialysis    Hepatic/GI:   GERD, well controlled Denies Liver Disease. Denies Hepatitis.    Neurological:   CVA (diagnosed by CT, no symptoms), no residual symptoms Denies Seizures.    Endocrine:   Diabetes, type 2, using insulin Denies Hypothyroidism. Denies Hyperthyroidism.  Denies Obesity / BMI > 30  Psych:   anxiety (panic attacks)          Physical Exam  General: Well nourished, Cooperative, Alert and Oriented    Airway:  Mallampati: I   Mouth Opening: Normal  TM Distance: Normal  Tongue: Normal  Neck ROM: Normal ROM    Dental:  Intact        Anesthesia Plan  Type of Anesthesia, risks & benefits discussed:    Anesthesia Type: Gen ETT  Intra-op Monitoring Plan: Standard ASA Monitors  Induction:  IV  Airway Plan: Video  Informed Consent: Informed consent signed with the Patient and all parties understand the risks and agree with anesthesia plan.  All questions answered.   ASA Score: 4  Day of Surgery Review of History & Physical: H&P Update referred to the surgeon/provider.    Ready For Surgery From Anesthesia Perspective.     .

## 2023-11-07 ENCOUNTER — PATIENT MESSAGE (OUTPATIENT)
Dept: ADMINISTRATIVE | Facility: OTHER | Age: 63
End: 2023-11-07
Payer: MEDICARE

## 2023-11-08 ENCOUNTER — PATIENT MESSAGE (OUTPATIENT)
Dept: ADMINISTRATIVE | Facility: OTHER | Age: 63
End: 2023-11-08
Payer: MEDICARE

## 2023-11-28 ENCOUNTER — TELEPHONE (OUTPATIENT)
Dept: TRANSPLANT | Facility: CLINIC | Age: 63
End: 2023-11-28
Payer: MEDICARE

## 2023-11-29 ENCOUNTER — TELEPHONE (OUTPATIENT)
Dept: TRANSPLANT | Facility: CLINIC | Age: 63
End: 2023-11-29
Payer: MEDICARE

## 2023-11-29 NOTE — TELEPHONE ENCOUNTER
MA notes per Adherence form     FOR THE PAST THREE MONTHS:    0-AMA's  0-No-shows    No concerns with care giving, transportation, or mental health    Scanned in pt's media    Christa Harvey  Abdominal Transplant MA

## 2023-12-01 ENCOUNTER — COMMITTEE REVIEW (OUTPATIENT)
Dept: TRANSPLANT | Facility: CLINIC | Age: 63
End: 2023-12-01
Payer: MEDICARE

## 2023-12-01 DIAGNOSIS — Z76.82 ORGAN TRANSPLANT CANDIDATE: Primary | ICD-10-CM

## 2023-12-01 NOTE — LETTER
December 1, 2023    Christian Abel DO  [address] [facility] [address]  Phone: [phone]  Fax: [phone]     Dear Dr. Abel:    Patient: Nalini Shah   MR Number: [id number]   YOB: 1960     Your patient, Nalini Shah, was recently discussed at the [facility] Selection Committee.  I am happy to inform you that Nalini has been approved for transplantation pending  revisit with our transplant nephrology to assess functional status, 6 minute walk, and Pap smear/GYN exam .  She has met selection criteria for a kidney transplant related to ESRD secondary to primary diagnosis of Diabetes Mellitus - Type II. Your patient will be placed on the cadaveric wait list pending final financial approval from insurance company.     We appreciate your confidence in allowing us to participate in your patients care.      If you have any questions or concerns, please do not hesitate to contact me.    Sincerely,      Courtney Jimenez MD  Medical Director, Kidney & Kidney/Pancreas Transplantation    CC:  Nalini Shah (patient)          [facility]

## 2023-12-01 NOTE — COMMITTEE REVIEW
Native Organ Dx: Diabetes Mellitus - Type II      SELECTION COMMITTEE NOTE    Nalini Shah was presented at selection committee on 12/1/23 .  Patient met selection criteria for kidney transplant related to ESRD due to Diabetes Mellitus - Type II.  No absolute contraindications to transplant at this time.  Patient will be placed on the cadaveric wait list pending  revisit with transplant nephrology to assess functional status, 6 minute walk, gyn visit  and final financial approval from insurance company.  Patient will return to clinic for routine appointment in 1 year(s). Patient does meet criteria for High KDPI kidney offer. Patient does meet HCV+ donor offer. Patient meets criteria for dual/enbloc. Planned immunosuppression Thymo.    Repeat colonoscopy due 03/2024    Attempted call pt. No answer, left message on vm.   Note written by Shari Blunt RN    ===============================================    I was present at the meeting and attest to the decision of the committee.    Brody Ro  12/01/2023

## 2023-12-05 ENCOUNTER — TELEPHONE (OUTPATIENT)
Dept: TRANSPLANT | Facility: CLINIC | Age: 63
End: 2023-12-05
Payer: MEDICARE

## 2023-12-05 NOTE — TELEPHONE ENCOUNTER
Attempted call to patient, no answer, left message on vm.   ----- Message from Arlene Christine RN sent at 12/4/2023  4:24 PM CST -----  Regarding: FW: Patient advice  Contact: [phone]    ----- Message -----  From: Nu Tam  Sent: 12/4/2023   3:30 PM CST  To: [facility] Pre-Kidney Transplant Clinical  Subject: Patient advice                                             Caller:    Nalini     Returning call to: Shari     Caller can be reached at: [phone]    Nature of the call: Returning missed call regarding Committee Decision please call after 2 will be in dialysis

## 2023-12-13 ENCOUNTER — TELEPHONE (OUTPATIENT)
Dept: TRANSPLANT | Facility: CLINIC | Age: 63
End: 2023-12-13
Payer: MEDICARE

## 2023-12-13 DIAGNOSIS — Z76.82 ORGAN TRANSPLANT CANDIDATE: Primary | ICD-10-CM

## 2023-12-13 NOTE — TELEPHONE ENCOUNTER
Spoke w/pt confirmed date, time, and location of upcoming appt. Pt informed she needs caregiver for appt. Reminder mailed

## 2023-12-21 ENCOUNTER — OFFICE VISIT (OUTPATIENT)
Dept: SURGERY | Facility: CLINIC | Age: 63
End: 2023-12-21
Payer: MEDICARE

## 2023-12-21 VITALS
HEART RATE: 73 BPM | HEIGHT: 65 IN | WEIGHT: 145 LBS | DIASTOLIC BLOOD PRESSURE: 64 MMHG | BODY MASS INDEX: 24.16 KG/M2 | SYSTOLIC BLOOD PRESSURE: 147 MMHG

## 2023-12-21 DIAGNOSIS — N18.5 CHRONIC RENAL FAILURE (CRF), STAGE 5: Primary | ICD-10-CM

## 2023-12-21 PROCEDURE — 99214 PR OFFICE/OUTPT VISIT, EST, LEVL IV, 30-39 MIN: ICD-10-PCS | Mod: TXP,,, | Performed by: SURGERY

## 2023-12-21 PROCEDURE — 99214 OFFICE O/P EST MOD 30 MIN: CPT | Mod: TXP,,, | Performed by: SURGERY

## 2023-12-21 RX ORDER — ZOLPIDEM TARTRATE 6.25 MG/1
6.25 TABLET, FILM COATED, EXTENDED RELEASE ORAL NIGHTLY PRN
COMMUNITY
Start: 2023-12-20 | End: 2024-03-19

## 2023-12-21 RX ORDER — GENTAMICIN SULFATE 1 MG/G
CREAM TOPICAL
COMMUNITY
Start: 2023-12-05

## 2023-12-21 RX ORDER — FOLIC ACID/VIT B COMPLEX AND C 0.8 MG
1 TABLET ORAL
COMMUNITY
Start: 2023-12-08

## 2023-12-21 RX ORDER — DOCUSATE SODIUM 100 MG/1
100 CAPSULE, LIQUID FILLED ORAL EVERY OTHER DAY
COMMUNITY

## 2023-12-21 RX ORDER — FLUTICASONE PROPIONATE 50 MCG
SPRAY, SUSPENSION (ML) NASAL
COMMUNITY
Start: 2023-06-28

## 2023-12-21 RX ORDER — BLOOD SUGAR DIAGNOSTIC
STRIP MISCELLANEOUS
COMMUNITY

## 2023-12-21 RX ORDER — CALCITRIOL 0.25 UG/1
0.25 CAPSULE ORAL EVERY OTHER DAY
COMMUNITY
Start: 2023-12-05

## 2024-01-04 ENCOUNTER — LAB VISIT (OUTPATIENT)
Dept: LAB | Facility: HOSPITAL | Age: 64
End: 2024-01-04
Payer: MEDICARE

## 2024-01-04 ENCOUNTER — OFFICE VISIT (OUTPATIENT)
Dept: TRANSPLANT | Facility: CLINIC | Age: 64
End: 2024-01-04
Payer: MEDICARE

## 2024-01-04 ENCOUNTER — HOSPITAL ENCOUNTER (OUTPATIENT)
Dept: PULMONOLOGY | Facility: CLINIC | Age: 64
Discharge: HOME | End: 2024-01-04
Payer: MEDICARE

## 2024-01-04 VITALS
RESPIRATION RATE: 16 BRPM | OXYGEN SATURATION: 96 % | BODY MASS INDEX: 25.05 KG/M2 | DIASTOLIC BLOOD PRESSURE: 53 MMHG | SYSTOLIC BLOOD PRESSURE: 114 MMHG | TEMPERATURE: 97 F | HEIGHT: 65 IN | WEIGHT: 150.38 LBS | HEART RATE: 65 BPM

## 2024-01-04 VITALS — BODY MASS INDEX: 24.98 KG/M2 | WEIGHT: 149.94 LBS | HEIGHT: 65 IN

## 2024-01-04 DIAGNOSIS — N18.6 TYPE 2 DIABETES MELLITUS WITH CHRONIC KIDNEY DISEASE ON CHRONIC DIALYSIS, WITH LONG-TERM CURRENT USE OF INSULIN: ICD-10-CM

## 2024-01-04 DIAGNOSIS — E78.5 HYPERLIPIDEMIA, UNSPECIFIED HYPERLIPIDEMIA TYPE: ICD-10-CM

## 2024-01-04 DIAGNOSIS — Z79.4 TYPE 2 DIABETES MELLITUS WITH CHRONIC KIDNEY DISEASE ON CHRONIC DIALYSIS, WITH LONG-TERM CURRENT USE OF INSULIN: ICD-10-CM

## 2024-01-04 DIAGNOSIS — Z01.818 PRE-TRANSPLANT EVALUATION FOR KIDNEY TRANSPLANT: Primary | ICD-10-CM

## 2024-01-04 DIAGNOSIS — Z76.82 ORGAN TRANSPLANT CANDIDATE: ICD-10-CM

## 2024-01-04 DIAGNOSIS — E11.22 TYPE 2 DIABETES MELLITUS WITH CHRONIC KIDNEY DISEASE ON CHRONIC DIALYSIS, WITH LONG-TERM CURRENT USE OF INSULIN: ICD-10-CM

## 2024-01-04 DIAGNOSIS — D63.1 ANEMIA OF CHRONIC RENAL FAILURE, STAGE 5: ICD-10-CM

## 2024-01-04 DIAGNOSIS — I15.0 RENOVASCULAR HYPERTENSION: ICD-10-CM

## 2024-01-04 DIAGNOSIS — N18.5 ANEMIA OF CHRONIC RENAL FAILURE, STAGE 5: ICD-10-CM

## 2024-01-04 DIAGNOSIS — Z95.1 HX OF CABG: ICD-10-CM

## 2024-01-04 DIAGNOSIS — Z99.2 TYPE 2 DIABETES MELLITUS WITH CHRONIC KIDNEY DISEASE ON CHRONIC DIALYSIS, WITH LONG-TERM CURRENT USE OF INSULIN: ICD-10-CM

## 2024-01-04 DIAGNOSIS — I25.10 CORONARY ARTERY DISEASE INVOLVING NATIVE CORONARY ARTERY OF NATIVE HEART WITHOUT ANGINA PECTORIS: ICD-10-CM

## 2024-01-04 PROCEDURE — 99999 PR PBB SHADOW E&M-EST. PATIENT-LVL IV: CPT | Mod: PBBFAC,TXP,, | Performed by: NURSE PRACTITIONER

## 2024-01-04 PROCEDURE — 94618 PULMONARY STRESS TESTING: CPT | Mod: PBBFAC,TXP | Performed by: INTERNAL MEDICINE

## 2024-01-04 PROCEDURE — 99215 OFFICE O/P EST HI 40 MIN: CPT | Mod: S$PBB,TXP,, | Performed by: NURSE PRACTITIONER

## 2024-01-04 PROCEDURE — 99214 OFFICE O/P EST MOD 30 MIN: CPT | Mod: PBBFAC,TXP | Performed by: NURSE PRACTITIONER

## 2024-01-04 PROCEDURE — 94618 PULMONARY STRESS TESTING: CPT | Mod: 26,S$PBB,TXP, | Performed by: INTERNAL MEDICINE

## 2024-01-04 NOTE — LETTER
January 18, 2024        Christian Abel  [address]  [facility] [address]  Phone: [phone]  Fax: [phone]             [facility] 1st Fl  [address]  [facility] [address]  Phone: [phone]   Patient: Nalini Shah   MR Number: [id number]   YOB: 1960   Date of Visit: 1/4/2024       Dear Dr. Christian Abel    Thank you for referring Nalini Shah to me for evaluation. Attached you will find relevant portions of my assessment and plan of care.    If you have questions, please do not hesitate to call me. I look forward to following Nalini Shah along with you.    Sincerely,    Lola Vazquez, NP    Enclosure    If you would like to receive this communication electronically, please contact [email or web address] or [phone] to request Kaboodle Link access.    Kaboodle Link is a tool which provides read-only access to select patient information with whom you have a relationship. Its easy to use and provides real time access to review your patients record including encounter summaries, notes, results, and demographic information.    If you feel you have received this communication in error or would no longer like to receive these types of communications, please e-mail [email or web address]

## 2024-01-04 NOTE — PROCEDURES
Nalini Shah is a 63 y.o.  female patient, who presents for a 6 minute walk test ordered by VESTA Seaman.  The diagnosis is Pre-operative Evaluation.  The patient's BMI is 24.9 kg/m2.  Predicted distance (lower limit of normal) is 349.5 meters.      Test Results:    The test was completed without stopping. The total time walked was 360 seconds. During walking, the patient reported:  No complaints. The patient used a walker for assistance during testing.     01/04/2024---------Distance: 213.36 meters (700 feet)     O2 Sat % Supplemental Oxygen Heart Rate Blood Pressure Keo Scale   Pre-exercise  (Resting) 97 % Room Air 79 bpm 189/77 mmHg 0.5   During Exercise 91 % Room Air 86 bpm 187/77 mmHg 3   Post-exercise  (Recovery) 97 % Room Air  82 bpm       Recovery Time: 72 seconds    Performing nurse/tech: Estopinal RRT      PREVIOUS STUDY:   The patient has not had a previous study.      CLINICAL INTERPRETATION:  Six minute walk distance is 213.36 meters (700 feet) with moderate dyspnea.  During exercise, there was significant desaturation while breathing room air.  Both blood pressure and heart rate remained stable with walking.   Hypertension was present prior to exercise.  The patient did not report non-pulmonary symptoms during exercise.  Significant exercise impairment is likely due to desaturation, cardiovascular causes, and subjective symptoms.  The patient did complete the study, walking 360 seconds of the 360 second test.  No previous study performed.  Based upon age and body mass index, exercise capacity is less than predicted.

## 2024-01-04 NOTE — PROGRESS NOTES
Transplant Nephrology  Kidney Transplant Recipient Evaluation    Referring Physician: Christian Abel  Current Nephrologist: Christian Abel    Subjective:   CC:  Initial evaluation of kidney transplant candidacy.     HPI:  Ms. Shah is a 62 y.o. year old White female who has presented to be evaluated as a potential kidney transplant recipient.  She has ESRD secondary to  (per medical record) acute kidney injury from obstructive uropathy and pyelonephritis, combined with underlying CKD from hypertensive versus diabetic nephropathy..  Patient is currently on hemodialysis started on 3/2023. Patient is dialyzing on MWF schedule.  Patient reports that she is tolerating dialysis well.. She has a dialysis catheter for dialysis access. She is dialyzing for 3 1/2 hours per session. Planning on switching to PD soon.     DM, HTN, HLD, CVA, CAD (s/p CABG x3 6/15/23)    Previous Transplant: no    SELECTION COMMITTEE NOTE 12/1/23     Nalini Shah was presented at selection committee on 12/1/23 .  Patient met selection criteria for kidney transplant related to ESRD due to Diabetes Mellitus - Type II.  No absolute contraindications to transplant at this time.  Patient will be placed on the cadaveric wait list pending  revisit with transplant nephrology to assess functional status, 6 minute walk, gyn visit  and final financial approval from insurance company.  Patient will return to clinic for routine appointment in 1 year(s). Patient does meet criteria for High KDPI kidney offer. Patient does meet HCV+ donor offer. Patient meets criteria for dual/enbloc. Planned immunosuppression Thymo.     Repeat colonoscopy due 03/2024    Here today for revisit prior to listing. See committee note above. Her GYN appointment is on 1/22/24. Her 6 min walk is scheduled for later this afternoon. She has started on PD since early December without issue. She has upcoming cardiology appointment later this month.     Fx assessment:   She uses a  rolling walker most of the time d/t balance problems , Also will use a cane inside her home. Is able to do cooking and ADLs. She has some LE weakness/balance problems with good upper body strength. Does not appear frail.       Past Medical and Surgical History: Ms. Shah  has a past medical history of Acute pyelonephritis, Anemia, unspecified, Anxiety and depression, Arthritis, Asthma, Asthma due to seasonal allergies, At risk for infection, Chronic obstructive lung disease, Coronary artery disease, Diabetes mellitus, Dialysis patient, Encounter for blood transfusion, ESRD (end stage renal disease), Hyperkalemia, Hyperlipidemia, Hypertension, Insomnia, Metabolic acidosis, Obstructive uropathy, Panic attacks, Proteinuria, Restless leg, Stroke, and Urinary tract infection, site not specified.  She has a past surgical history that includes Ureteral stent placement; Cholecystectomy (2003); Foot surgery (2020); Eye surgery (Right); Tonsillectomy; Cataract extraction; Bilateral tubal ligation; Tubal ligation; Colonoscopy; Cardiac surgery (06/15/2023); AV fistula placement (08/31/2023); Insertion of dialysis catheter (03/2023); and insertion, catheter, dialysis, peritoneal, laparoscopic (N/A, 11/6/2023).    Past Social and Family History: Ms. Shah reports that she has never smoked. She has never used smokeless tobacco. She reports that she does not currently use alcohol. She reports that she does not use drugs. Her family history includes Asthma in her mother; COPD in her father; Diabetes in her mother and sister; Heart disease in her mother; Hypertension in her sister; Kidney disease in her mother and sister; Leukemia in her brother; Lung cancer in her father; Stroke in her sister.    Review of Systems   Constitutional:  Positive for fatigue. Negative for appetite change, chills and fever.   HENT:  Negative for trouble swallowing and voice change.    Eyes:  Positive for visual disturbance.   Respiratory:  Negative for  "cough, chest tightness, shortness of breath and wheezing.    Cardiovascular:  Negative for chest pain, palpitations and leg swelling.   Gastrointestinal:  Positive for constipation. Negative for abdominal pain, diarrhea and nausea.   Genitourinary:  Negative for difficulty urinating, frequency and urgency.   Musculoskeletal:  Positive for arthralgias and gait problem (usea a rolling walker or cane for support). Negative for myalgias.   Skin:  Negative for rash.   Neurological:  Positive for dizziness (intermittent) and weakness (peripheral neuropathy). Negative for light-headedness and headaches.   Psychiatric/Behavioral:  Positive for sleep disturbance.         Hx Anxiety and depression        Objective:   Blood pressure (!) 114/53, pulse 65, temperature 97.2 °F (36.2 °C), temperature source Temporal, resp. rate 16, height 5' 5" (1.651 m), weight 68.2 kg (150 lb 5.7 oz), SpO2 96 %.body mass index is 25.02 kg/m².    Physical Exam  Vitals reviewed.   Constitutional:       Appearance: Normal appearance. She is well-developed.   HENT:      Head: Normocephalic.   Eyes:      Pupils: Pupils are equal, round, and reactive to light.   Cardiovascular:      Rate and Rhythm: Normal rate and regular rhythm.      Heart sounds: Normal heart sounds.   Pulmonary:      Effort: Pulmonary effort is normal.      Breath sounds: Normal breath sounds.   Chest:       Abdominal:      General: Bowel sounds are normal.      Palpations: Abdomen is soft.   Musculoskeletal:         General: Normal range of motion.      Cervical back: Normal range of motion and neck supple.   Skin:     General: Skin is warm and dry.   Neurological:      Mental Status: She is alert and oriented to person, place, and time.      Motor: Weakness present. No abnormal muscle tone.   Psychiatric:         Behavior: Behavior normal.         Labs:  Lab Results   Component Value Date    WBC 5.31 10/27/2023    HGB 10.5 (L) 10/27/2023    HCT 35.0 (L) 10/27/2023     " "11/06/2023    K 4.1 11/06/2023    CL 99 (L) 08/31/2023    CO2 28 11/06/2023    BUN 32.2 (H) 11/06/2023    CREATININE 4.30 (H) 11/06/2023    EGFRNORACEVR 11 11/06/2023    GLUCOSE 79 (L) 11/06/2023    CALCIUM 8.8 11/06/2023    PHOS 3.2 05/23/2023    MG 1.90 02/13/2023    ALBUMIN 3.6 10/27/2023    AST 13 10/27/2023    ALT 16 10/27/2023    .8 (H) 05/23/2023       Lab Results   Component Value Date    BILIRUBINUA Negative 01/18/2023    LIPASE 14 03/16/2023    PROTEINUA 3+ (A) 01/18/2023    NITRITE Negative 07/16/2020    RBCUA 5 01/18/2023    WBCUA 306 (H) 01/18/2023       No results found for: "HLAABCTYPE"    Labs were reviewed with the patient.    Assessment:     1. Pre-transplant evaluation for kidney transplant    2. Renovascular hypertension    3. Hyperlipidemia, unspecified hyperlipidemia type    4. Type 2 diabetes mellitus with chronic kidney disease on chronic dialysis, with long-term current use of insulin    5. Anemia of chronic renal failure, stage 5    6. Coronary artery disease involving native coronary artery of native heart without angina pectoris    7. Hx of CABG 6/2023          Plan:   Awaiting 6min walk test (later this afternoon) along with GYN appointment later this month.  If both test and follow-up acceptable can be listed.    She will be 63 yo in June. She will start requiring follow-up every 6 months.     WE HAD A LENGTHY CONVERSATION ABOUT  AGE AND TRANSPLANT CANDIDACY. I SPECIFICALLY ADDRESSED LIVING DONATION. A CARE GIVER PRESENT DURING THE INTERVIEW ASKED SEVERAL QUESTIONS THAT I answered in detail.  Otherwise patient patient still looks acceptable for kidney transplant  Frailty index and functional capacity are acceptable. Data review is also acceptable. At the time of this clinic visit no absolute contra-indications for kidney transplant      Transplant Candidacy:   Based on available information, Ms. Shah is a high-risk kidney transplant candidate. CAD s/p CABG  Meets center " eligibility for accepting HCV+ donor offer - Yes.  Patient educated on HCV+ donors. Nalini is willing to accept HCV+ donor offer - Yes   Patient is a candidate for KDPI > 85 kidney donor offer - Yes.  Final determination of transplant candidacy will be made once workup is complete and reviewed by the selection committee.    Patient advised that it is recommended that all transplant candidates, and their close contacts and household members receive Covid vaccination.    UNOS Patient Status  Functional Status: 60% - Requires occasional assistance but is able to care for needs    Counseling:  Exercise: reminded patient of the importance of regular exercise for weight management, blood sugar and blood pressure management.  I also explained exercise has been shown to improve cardiovascular health, energy level, and sleep hygiene.  Lastly, I advised her that cardiovascular complications are leading cause of death for renal transplant recipients, and regular exercise can help lower this risk.    We discussed various aspects of kidney transplantation including transplant surgery, immunosuppressive medications and the need for close follow up. We discussed the pregnancy (child bearing age female) following transplant, adverse effects of MMF/MYF on a fetus, and the need to plan pregnancy with TXP post receiving a kidney transplant. We also discussed side effects of immunosuppression including weight gain, hypertension, hyperlipidemia, new-onset diabetes after transplantation, infections and malignancies, especially skin cancers and lymphomas. I also reviewed the risk of acute rejection, vascular thrombosis, recurrent disease and potential transmission of infections such as hepatitis and HIV. I informed the patient that the average time on the wait list in the [facility] is between 5 to 7 years.       Lola Vazquez NP

## 2024-01-04 NOTE — PROGRESS NOTES
PRE-TRANSPLANT INFECTIOUS DISEASE CONSULT    Reason for Visit:  Pre-transplant evaluation  Referring Provider: Breanna Seaman     History of Present Illness:    63 y.o. female with a history of ESRD 2/2 DMII presents for pre-kidney transplant evaluation. Pt is on HD, plans to transition to PD    Infectious History:  Recent hospital admissions: {YES No:25870}  Recent infections: {YES No:49328}  Recent or current antibiotic use: {YES No:12473}  History of recurrent infections *(sinus / pneumonia / UTI / SBP)*: {YES No:88517}  History of diabetic foot wound or bone/joint infection: {YES No:93492}  Recent dental infections, issues or procedures: {YES No:61653}  History of chicken pox: {YES No:57853}  History of shingles: {YES No:70806}  History of STI: {YES No:87441}  History of COVID infection: {YES No:47924}    History of Immunosuppression:  Prior chemotherapy / immunosuppression: {YES No:54313}  Prior transplant: {YES No:60339}  History of splenectomy: {YES No:61142}    Tuberculosis:  Prior screening for latent TB: {YES No:65362}  Prior diagnosis of latent TB: {YesWhenTreatmentNo:71409}  Risk factors for TB *known exposure, incarceration, homelessness*: {YES No:67600}    Geographical exposures:  Currently lives in *** with ***  Lived in the following states: ***  Lived or travelled to the [facility] [place]: {YES No:63671}  International travel: {YES No:71936}  Travel-associated illness: {YES No:36674}    Social/Environmental:  Occupation:  ***  Pets: {YES No:86773}   Livestock: {YES No:91207}  Fishing / hunting: {YES No:65380}  Hobbies: ***  Water: {water:02315}  Consumption of raw/undercooked meat or seafood?  {YES No:95999}  Tobacco: {YES No:58224}  Alcohol: {YES No:95947}  Recreational drug use:  {YES No:74272}  Sexual partners: ***      Past Histories:   Past Medical History:   Diagnosis Date    Acute pyelonephritis     Anemia, unspecified     Anxiety and depression     Arthritis     Asthma     Asthma due to seasonal  allergies     At risk for infection     Chronic obstructive lung disease     Coronary artery disease     Diabetes mellitus     Dialysis patient     Encounter for blood transfusion     ESRD (end stage renal disease)     dialysis TTS 1140 at [facility]    Hyperkalemia     Hyperlipidemia     Hypertension     Insomnia     Metabolic acidosis     Obstructive uropathy     Panic attacks     Proteinuria     Restless leg     Stroke     Urinary tract infection, site not specified      Past Surgical History:   Procedure Laterality Date    AV FISTULA PLACEMENT  08/31/2023    BILATERAL TUBAL LIGATION      CARDIAC SURGERY  06/15/2023    TRIPLE BYPASS    CATARACT EXTRACTION      CHOLECYSTECTOMY  2003    COLONOSCOPY      EYE SURGERY Right     muscle reattached- cosmetic    FOOT SURGERY  2020    INSERTION OF DIALYSIS CATHETER  03/2023    INSERTION, CATHETER, DIALYSIS, PERITONEAL, LAPAROSCOPIC N/A 11/6/2023    Procedure: INSERTION, CATHETER, DIALYSIS, PERITONEAL, LAPAROSCOPIC;  Surgeon: Felipe Jane MD;  Location: [facility] OR;  Service: General;  Laterality: N/A;    TONSILLECTOMY      TUBAL LIGATION      URETERAL STENT PLACEMENT       Family History   Problem Relation Age of Onset    Kidney disease Mother     Asthma Mother     Diabetes Mother     Heart disease Mother     COPD Father     Lung cancer Father     Hypertension Sister     Kidney disease Sister     Diabetes Sister     Stroke Sister     Leukemia Brother      Social History     Tobacco Use    Smoking status: Never    Smokeless tobacco: Never   Substance Use Topics    Alcohol use: Not Currently    Drug use: Never     Review of patient's allergies indicates:   Allergen Reactions    Iodine Shortness Of Breath     OKAY WITH TOPICAL PREPS.       Shellfish containing products Shortness Of Breath    Spinach leaf Hives    Amlodipine      Eye sight issues  Eye sight issues      Pioglitazone Nausea And Vomiting     Diarrhea & vomiting  Diarrhea & vomiting           Immunization  History:  Received all childhood vaccines: {YES No:26343}  All household members receive annual flu vaccine: {YES No:22321}  All household members are up to date on COVID vaccine: {YES No:22321}      Current antibiotics:  Antibiotics (From admission, onward)      None              Review of Systems  ROS       Objective  Physical Exam      Labs:    CBC:   Lab Results   Component Value Date    WBC 5.31 10/27/2023    HGB 10.5 (L) 10/27/2023    HCT 35.0 (L) 10/27/2023    .2 (H) 10/27/2023     (L) 10/27/2023    GRAN 3.4 05/23/2023    GRAN 70.8 05/23/2023    LYMPH 0.9 (L) 05/23/2023    LYMPH 18.2 05/23/2023    MONO 0.3 05/23/2023    MONO 7.1 05/23/2023    EOSINOPHIL 2.7 05/23/2023       Syphilis screening:   Lab Results   Component Value Date    RPR Non-reactive 05/23/2023        TB screening:   Lab Results   Component Value Date    TBGOLDPLUS Negative 05/23/2023       HIV screening:   Lab Results   Component Value Date    TDE30ENJA Non-reactive 05/23/2023       Strongyloides IgG:   Lab Results   Component Value Date    STRONGANTIGG Negative 05/23/2023       Hepatitis Serologies:   Lab Results   Component Value Date    HEPAIGG Non-reactive 05/23/2023    HEPBCAB Non-reactive 05/23/2023    HEPBSAB <3.00 05/23/2023    HEPBSAB Non-reactive 05/23/2023    HEPCAB Non-reactive 05/23/2023        Varicella IgG:   Lab Results   Component Value Date    VARICELLAINT Positive 05/23/2023         Immunization History   Administered Date(s) Administered    COVID-19, MRNA, LN-S, PF (Pfizer) (Gray Cap) 05/14/2022    COVID-19, MRNA, LN-S, PF (Pfizer) (Purple Cap) 03/03/2021, 03/24/2021, 10/06/2021    COVID-19, mRNA, LNP-S, PF, sania-sucrose, 30 mcg/0.3 mL (Pfizer 2023 Ages 12+) 09/23/2023    COVID-19, mRNA, LNP-S, bivalent booster, PF (PFIZER OMICRON) 10/21/2022    Influenza - Quadrivalent - PF *Preferred* (6 months and older) 03/16/2019, 10/21/2022    Influenza - Trivalent - PF (ADULT) 12/30/2014    Pneumococcal Polysaccharide  - 23 Valent 12/10/2012    Zoster Recombinant 05/14/2022          Assessment and Plan    1. Risks of Infection: Available serologies were reviewed. No unusual risks of infection or significant barriers to transplantation were identified from the infectious disease standpoint given the information available at this time.    - Acute infectious issues: {acuteinfection:36718}   - Pending serologies: {pendingIDserologies:69814}   - Please call if any pending serologic testing is positive.    2. Immunizations:  Based on the patient's immunization history and serologies, the following immunizations are recommended:  - Hepatitis A    Patient {DOES/NOT:30421} have immunity to hepatitis A    Vaccination ordered today: {vaccineorderedYesNoWhyNot:56866}   - Hepatitis B    Patient {DOES/NOT:25541} have immunity to hepatitis B    Vaccination ordered today: {hepatitisbvaccineYesNoWhyNot:75448}   - COVID    Current [facility] vaccination recommendations were discussed with the patient   - Annual high dose influenza     Vaccination ordered today: {vaccineorderedYesNoWhyNot:53957}   - Prevnar 20    Vaccination ordered today: {vaccineorderedYesNoWhyNot:08527}   - Tdap    Vaccination ordered today: {vaccineorderedYesNoWhyNot:70126}   - Shingrix    Vaccination ordered today: {vaccineorderedYesNoWhyNot:95157}    Recommended Pre-Transplant Immunization Schedule   Vaccine  0m 1m 2m 6m   Pneumococcal conjugate vaccine (Prevnar 20) X      Tetanus-diphtheria-pertussis (Tdap)* X      Hepatitis A Vaccine (Havrix)** X   X   Hepatitis B Vaccine (Heplisav)** X X     Influenza (annual) X      Zoster Recombinant Vaccine (Shingrix) X  X           *Administer booster every 10 years.       **Administer if no immunity demonstrated on serologies               Patient will receive vaccines {vaccinelocation:47715}    3. Counseling:   I discussed with the patient the risk for increased susceptibility to infections following transplantation including increased risk  for infection right after transplant and if rejection should occur.  The patient has been counseled on the importance of vaccinations to decrease risk of infection and severe illness. Specific guidance has been provided to the patient regarding the patient's occupation, hobbies and activities to avoid future infectious complications.     4. Transplant Candidacy: Based on available information, there are no identified significant barriers to transplantation from an infectious disease standpoint.  Final determination of transplant candidacy will be made once evaluation is complete and reviewed by the Selection Committee.      Follow up with infectious disease as needed.       The total time for evaluation and management services performed on 01/04/2024 was greater than *** minutes.

## 2024-01-19 ENCOUNTER — COMMITTEE REVIEW (OUTPATIENT)
Dept: TRANSPLANT | Facility: CLINIC | Age: 64
End: 2024-01-19
Payer: MEDICARE

## 2024-01-19 NOTE — LETTER
January 19, 2024    Nalini Shah  [address] [place] [address]    Dear Nalini Shah:  MRN: [id number]    It is the duty of the [facility] Selection [facility] to determine which patients are candidates for a transplant. For this reason, our committee has the difficult task of evaluating patients to determine which ones have the greatest chance of having a successful transplant. We are aware of the magnitude of this responsibility, and we approach it with reverence and humility.    It is with regret I inform you that you are not approved as a transplant candidate due to  cardiovascular disease with significant exercise impairment on 6 minute walk test placing you at increased risk for dyan/post operative cardiac complications.   Based on this review, we have determined that at this time, you are not a candidate for a transplant at [facility].      The selection committee carefully considers each patient's transplant candidacy and determines whether it is safe to proceed with transplantation on a case-by-case basis using established selection criteria.  At present, the risk of proceeding with an elective transplant surgery has become too high.                                                                               Although the selection committee believes you are not a suitable transplant candidate, you have the option to be evaluated at other transplant centers who may have different selection criteria.  You may request your [facility] records be sent to any center of your choice by contacting our Medical Records Department at [phone].                                                                               Attached is a letter from the United Network for Organ Sharing (UNOS).  It describes the services and information offered to patients by UNOS and the Organ Procurement and Transplant Network.    The [facility] sincerely wishes you the best and remains  available to answer any questions.  Please do not hesitate to contact our pre-transplant office if we can assist you in any other way.                                                                               Sincerely,      Courtney Jimenez MD  Medical Director, Kidney & Kidney/Pancreas Transplantation      Cc:   Christian Abel DO           [facility] [place]    Encl: UNOS Letter               The Organ Procurement and Transplantation Network   Toll-free patient services line: [phone]  Your resource for organ transplant information      Staffed 8:30 am - 5:00 pm ET Monday - Friday   Leave a message 24/7 to receive a call back    The Organ Procurement and Transplantation Network (OPTN) is the national transplant system. It makes the policies that decide how donated organs are matched to patients waiting for a transplant. The OPTN:    Makes sure donated organs get matched to people on the transplant waiting list  Tells people about the donation and transplant processes  Makes sure that the public knows about the need for more organ and tissue donations    The OPTN has a free patient services line that you can call to:  Get more information about:   o Organ donation and organ transplants   o Donation and transplant policies  Get an information kit with:   o A list of transplant hospitals   o Waiting list information  Talk about any questions you may have about your transplant hospital or organ procurement organization. The staff will do their best to help you or point you to others who may help.  Find out how you can volunteer with the OPTN and help shape transplant policy    The patient services line number is: 8-556-039-8278    Patient services line staff CANNOT answer questions about your own medical care, including:  Waiting list status  Test results  Medical records  You will need to call your transplant hospital for this information.    The following websites have more  information about transplantation and donation:  OPTN: https://optn.transplant.hrsa.gov/  For potential living donors and transplant recipients:   o Living with transplant: https://www.transplantliving.org/   o Living donation process: https://optn.transplant.hrsa.gov/living-donation/     o Financial assistance: https://www.livingdonorassistance.org/  Transplantation data: https://www.srtr.org/  Organ donation: https://www.organdonor.gov/    Volunteer with the OPTN: https://optn.transplant.hrsa.gov/get-involved

## 2024-01-19 NOTE — COMMITTEE REVIEW
"Native Organ Dx: Diabetes Mellitus - Type II      Not approved for LRD/CAD transplant due to cardiovascular disease with significant exercise impairment on 6 min walk test.    Spoken to patient in regards to committee's decision. Patient was very upset. I tried to explained to that she was approved pending revisit and 6 min walk. I explained that her walk test was significantly impaired. She stated that she's doing better than she was in May of last year. She stated that "you people playing with people lives, going back and forward saying you are approved now you are not approved. Thank you any F'ing way" patient hung up.       Note written by Shari Blunt RN    ===============================================    I was present at the meeting and attest to the decision of the committee  "

## 2024-01-22 NOTE — PROGRESS NOTES
Transplant Recipient Adult Psychosocial Assessment    Nalini [place] [address]  Telephone Information:   Mobile [phone]   Home  [phone] (home)  Work  There is no work phone number on file.  E-mail  [email or web address]    Sex: female  YOB: 1960  Age: 63 y.o.    Encounter Date: 1/4/2024  [place] Citizen: yes  Primary Language: English   Needed: no    Emergency Contact:  Mellissa and Carlos Eduardo Says, 66  and 70yo sister, [place] (pt lives with Mellissa and her  Sean), does drive/own car, works full time as  at home. [phone] and [phone]    Family/Social Support:   Number of dependents/: pt denies  Marital history: pt reports is  10 years ago.  Other family dynamics: Pt reports large and supportive family. Pt reports is  10 years ago. Pt reports lives with highly supportive sister Mellissa Murillo and brother in law Sean Says in [place] and both will be assisting with transplant as caregivers. Pt reports  was in [employer] and patient has [employer] through his service. Pt reports having 5 supportive grown children who will assist with transplant as needed.    Pt's grown children:  Jorge Abre Shah, 46 yo son, [place]. [phone]  Javaddedra Shah, 41 yo son, [place]. [phone]  Cresencio Shah, 39 yo son. [phone]  Rajiv Shah, 38 yo son, lives in [facility] and soon to be in [place], in [employer]. [phone]  Carolina Pablo Luna, 37 daughter, [place]. [phone]    Household Composition:  Mellissa Says, 65 yo sister, [place], does drive/own car, works full time as  at home. [phone]  Sean Says, 70 yo brother in law, [place], does drive/own car, works full time as  at home. [phone]    Do you and your caregivers have access to reliable transportation? yes  PRIMARY CAREGIVER: Mellissa Says, sister, will be primary caregiver, phone number [phone]      provided in-depth information to patient and caregiver regarding pre- and post-transplant caregiver role.   strongly encourages patient and caregiver to have concrete plan regarding post-transplant care giving, including back-up caregiver(s) to ensure care giving needs are met as needed.    Patient and Caregiver states understanding all aspects of caregiver role/commitment and is able/willing/committed to being caregiver to the fullest extent necessary.    Patient and Caregiver verbalizes understanding of the education provided today and caregiver responsibilities.         remains available. Patient and Caregiver agree to contact  in a timely manner if concerns arise.      Able to take time off work without financial concerns: yes.     Additional Significant Others who will Assist with Transplant:  Sean Murillo, 70 yo brother in law, [place], does drive/own car, works full time as  at home. [phone]  Cresencio Shah, 41 yo son, [place], does drive/own car, works full time as assistant supervisor at [employer]. [phone]    Living Will: yes  Healthcare Power of : yes Pt reports trusting Bill with medical decisions as needed  Advance Directives on file: <<no information> per medical record.  Verbally reviewed LW/HCPA information.   provided patient with copy of LW/HCPA documents and provided education on completion of forms.    Living Donors: Education and resource information given to patient.    Highest Education Level: Associate/Bachelor Degree  Reading Ability: college  Reports difficulty with: seeing and utilizes reading glasses  Learns Best By:  multisensory     Status: no  VA Benefits: yes pt reports is . Does have  for life through 's [employer] [place] service. Pt reports can get medicine through [facility].     Previously reported:but they use [facility] and patient reports [facility] can be hard to use.  Pt reports prefers to use [facility].     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 Pt reports went back to school after working at [employer] and earned her BA in history and minor in Art.  Pt reports never returned to work after getting her BA because her  became ill and she was his caregiver. Pt reports is now . Reports  was in [employer]. Pt reports receives 's  senior care and from a [employer] annuity.    Spouse/Significant Other Employment: pt reports is not  at this time.    Disabled: pt denies    Monthly Income:    government annuity: $1200     Social Security Widows Supplement $1300  Able to afford all costs now and if transplanted, including medications: yes  Patient and Caregiver verbalizes understanding of personal responsibilities related to transplant costs and the importance of having a financial plan to ensure that patients transplant costs are fully covered.       provided fundraising information/education. Patient and Caregiververbalizes understanding.   remains available.    Insurance:   Payer/Plan Subscr  Sex Relation Sub. Ins. ID Effective Group Num   1. MEDICARE - QUINTEN SZYMANSKI 1960 Female Self [id number]                                    [address]   2.  FOR IVETTE LOPEZ JR. [birth year]/1957 Male Spouse [id number]                                    [address]     Primary Insurance (for UNOS reporting): Public Insurance - Medicare FFS (Fee For Service)  Secondary Insurance (for UNOS reporting): Public Insurance - Other Government  Patient and Caregiver verbalizes clear understanding that patient may experience difficulty obtaining and/or be denied insurance coverage post-surgery. This includes and is not limited to disability  insurance, life insurance, health insurance, burial insurance, long term care insurance, and other insurances.      Patient and Caregiver also reports understanding that future health concerns related to or unrelated to transplantation may not be covered by patient's insurance.  Resources and information provided and reviewed.     Patient and Caregiver provides verbal permission to release any necessary information to outside resources for patient care and discharge planning.  Resources and information provided are reviewed.      [facility] [place], [phone].  Hemodialysis: Tues, Thurs and Sat 3.5 hours    Dialysis Adherence: Patient and Caregiver reports high dialysis compliance with treatments and instructions within last 3 months. Pt started PD treatments in Dec 2023.    Infusion Service: patient utilizing? no  Home Health: patient utilizing? yes [facility] home health 1 x every 2 weeks for vital sign checks.   DME: rollater, 3 prong cane, bpc, glucose monitor, PD equipment   Pulmonary/Cardiac Rehab: Pt reports experiencing a stroke in Summer 2023. Pt completed cardiac rehabilitation outpt 3x weekly for 6 weeks at [facility].   ADLS:  needs assistance with walking and has balance problems. Utilizes rollator and 3 prong cane for balance. Pt reports does not drive due to eyesight problems. Pt reports sister Mellissa and brother in law Estrada provide all transportation, including for dialysis.    Adherence:  Adherence education and counseling provided.     Per History Section:  Past Medical History:   Diagnosis Date    Acute pyelonephritis     Anemia, unspecified     Anxiety and depression     Arthritis     Asthma     Asthma due to seasonal allergies     At risk for infection     Chronic obstructive lung disease     Coronary artery disease     Diabetes mellitus     Dialysis patient     Encounter for blood transfusion     ESRD (end stage renal disease)     dialysis TTS 1140 at [facility]     "Hyperkalemia     Hyperlipidemia     Hypertension     Insomnia     Kidney disease     Metabolic acidosis     Obstructive uropathy     Panic attacks     Proteinuria     Restless leg     Stroke     Urinary tract infection, site not specified      Social History     Tobacco Use    Smoking status: Never    Smokeless tobacco: Never   Substance Use Topics    Alcohol use: Not Currently     Social History     Substance and Sexual Activity   Drug Use Never     Social History     Substance and Sexual Activity   Sexual Activity Not Currently       Per Today's Psychosocial:  Tobacco: none, patient denies any use.  Alcohol: none, patient denies any use.  Illicit Drugs/Non-prescribed Medications: none, patient denies any use.    Patient and Caregiver states clear understanding of the potential impact of substance use as it relates to transplant candidacy and is aware of possible random substance screening.  Substance abstinence/cessation counseling, education and resources provided and reviewed.     Arrests/DWI/Treatment/Rehab: patient denies    Psychiatric History:    Mental Health: Previously Reported: Pt reports history of depression and anxiety due to "uncovered memories" and was in counseling for 10 years. Pt denies any mental health hospitalizations. Pt reports no longer in therapy as it "has run its course" and pt reports has learned and is using ways to cope well with feelings.   Today Reported: Pt reports utilizes family support, aaliyah and prayer, exercises while seated, enjoys photography, reading and travel. SW provided acknowledgement active listening,  validation and support.   Psychiatrist/Counselor: See above  Medications:  Ambien 5 mg.  Suicide/Homicide Issues: pt denies any si/hi   Safety at home: Pt reports living in safe home environment with no abuse at this time    Knowledge: Patient and Caregiver states having clear understanding and realistic expectations regarding the potential risks and potential benefits " of organ transplantation and organ donation and agrees to discuss with health care team members and support system members, as well as to utilize available resources and express questions and/or concerns in order to further facilitate the pt informed decision-making.  Resources and information provided and reviewed.    Patient and Caregiver is aware of [facility]'s affiliation and/or partnership with agencies in home health care, LTAC, SNF, [facility], and other hospitals and clinics.    Understanding: Patient and Caregiver reports having a clear understanding of the many lifetime commitments involved with being a transplant recipient, including costs, compliance, medications, lab work, procedures, appointments, concrete and financial planning, preparedness, timely and appropriate communication of concerns, abstinence (ETOH, tobacco, illicit non-prescribed drugs), adherence to all health care team recommendations, support system and caregiver involvement, appropriate and timely resource utilization and follow-through, mental health counseling as needed/recommended, and patient and caregiver responsibilities.  Social Service Handbook, resources and detailed educational information provided and reviewed.  Educational information provided.    Patient and Caregiver also reports current and expected compliance with health care regime and states having a clear understanding of the importance of compliance.      Patient and Caregiver reports a clear understanding that risks and benefits may be involved with organ transplantation and with organ donation.       Patient and Caregiver also reports clear understanding that psychosocial risk factors may affect patient, and include but are not limited to feelings of depression, generalized anxiety, anxiety regarding dependence on others, post traumatic stress disorder, feelings of guilt and other emotional and/or mental concerns, and/or exacerbation of existing mental health concerns.   Detailed resources provided and discussed.      Patient and Caregiver agrees to access appropriate resources in a timely manner as needed and/or as recommended, and to communicate concerns appropriately.  Patient and Caregiver also reports a clear understanding of treatment options available.     Patient and Caregiver received education in a group setting.   reviewed education, provided additional information, and answered questions.    Feelings or Concerns: Pt reports high motivation to pursue kidney organ transplant at this time.     Coping: Identify Patient & Caregiver Strategies to [place]:   1. In the past, coping with major surgery and/or related stress - Pt reports utilizes family support, aaliyah and prayer, exercises while seated, enjoys photography, reading and travel.    2. Currently & Pre-transplant - Pt reports utilizes family support, aaliyah and prayer, exercises while seated, enjoys photography, reading and travel.    3. At the time of surgery - family support, aaliyah and prayer, reading   4. During post-Transplant & Recovery Period - family support, aaliyah and prayer, reading    Goals: Pt reports hope for successful kidney organ transplant so she can stop dialysis and have healthier life.  Patient referred to Vocational Rehabilitation.    Interview Behavior: Patient and Caregiver presents as alert and oriented x 4, pleasant, good eye contact, well groomed, recall good, concentration/judgement good, average intelligence, calm, communicative, cooperative, and asking and answering questions appropriately. Pt's highly supportive brother-in-law Bill in session with patient's permission.          Transplant Social Work - Candidacy  Assessment/Plan:     Psychosocial Suitability: Patient presents as low risk candidate for kidney transplant at this time. Based on psychosocial risk factors, patient presents as low risk due to patient denying any psychosocial barriers to kidney organ transplant at this  time. Pt reports having organ transplant caregiver/transportation plan, medical insurance plan and plan to afford transplant costs all in place.     Recommendations/Additional Comments: Pt reports lives away and will stay with son Cresencio for post transplant SAM stay. Pt reports plan to ask medical insurance about any transplant benefits for travel, meals and lodging.    SW recommends that pt conduct fundraising to assist pt with pay for cost of medications, food, gas, and other transplant related needs.  SW recommends that pt remain aware of potential mental health concerns and contact the team if any concerns arise.  SW recommends that pt remain abstinent from tobacco, ETOH, and drug use.  SW supports pt's continued adherence. SW remains available to answer any questions or concerns that arise as the pt moves through the transplant process.      Final determination of transplant candidacy will be reviewed by the selection committee.      TRUNG Kern, RA

## 2024-02-20 PROCEDURE — 86833 HLA CLASS II HIGH DEFIN QUAL: CPT | Performed by: NURSE PRACTITIONER

## 2024-02-20 PROCEDURE — 86832 HLA CLASS I HIGH DEFIN QUAL: CPT | Performed by: NURSE PRACTITIONER

## 2024-02-21 LAB — HPRA INTERPRETATION: NORMAL

## 2024-05-07 PROCEDURE — 86833 HLA CLASS II HIGH DEFIN QUAL: CPT | Performed by: NURSE PRACTITIONER

## 2024-05-07 PROCEDURE — 86832 HLA CLASS I HIGH DEFIN QUAL: CPT | Performed by: NURSE PRACTITIONER

## 2024-05-07 PROCEDURE — 86977 RBC SERUM PRETX INCUBJ/INHIB: CPT | Performed by: NURSE PRACTITIONER

## 2024-06-11 LAB
CLASS I ANTIBODIES - LUMINEX: NORMAL
CLASS I ANTIBODY COMMENTS - LUMINEX: NORMAL
CLASS I ANTIBODY COMMENTS - LUMINEX: NORMAL
CLASS II ANTIBODIES - LUMINEX: NEGATIVE
CLASS II ANTIBODY COMMENTS - LUMINEX: NORMAL
CPRA %: 58
SERUM COLLECTION DT - LUMINEX CLASS I: NORMAL
SERUM COLLECTION DT - LUMINEX CLASS I: NORMAL
SERUM COLLECTION DT - LUMINEX CLASS II: NORMAL
SERUM COLLECTION DT - LUMINEX CLASS II: NORMAL
SLAA1 TESTING DATE: NORMAL
SLAA2 TESTING DATE: NORMAL
SPCL1 TESTING DATE: NORMAL
SPCL2 TESTING DATE: NORMAL
SPCLU TESTING DATE: NORMAL
SPLAA TESTING DATE: NORMAL

## 2025-08-23 ENCOUNTER — PATIENT MESSAGE (OUTPATIENT)
Dept: RESEARCH | Facility: HOSPITAL | Age: 65
End: 2025-08-23
Payer: MEDICARE

## (undated) DEVICE — DRESSING TELFA N ADH 3X8IN

## (undated) DEVICE — ADHESIVE DERMABOND ADVANCED

## (undated) DEVICE — WARMER DRAPE STERILE LF

## (undated) DEVICE — SUT SILK 0 SH 30IN BLK BR

## (undated) DEVICE — SYR 10CC LUER LOCK

## (undated) DEVICE — SOL NACL IRR 1000ML BTL

## (undated) DEVICE — GLOVE PROTEXIS HYDROGEL SZ7.5

## (undated) DEVICE — NDL GRANEE OPEN LOOP GRASPER

## (undated) DEVICE — ELECTRODE PATIENT RETURN DISP

## (undated) DEVICE — KIT GEN LAPAROSCOPY LAFAYETTE

## (undated) DEVICE — KIT SURGICAL TURNOVER

## (undated) DEVICE — TAPE CLOTH SOFT MEDIPORE 4IN

## (undated) DEVICE — SUT VICRYL PLUS 3-0 SH 18IN

## (undated) DEVICE — NDL HYPO 22GX1 1/2 SYR 10ML LL

## (undated) DEVICE — SYR 3CC LUER LOC

## (undated) DEVICE — Device

## (undated) DEVICE — TROCAR ENDOPATH XCEL 11MM 10CM

## (undated) DEVICE — SUT CTD VICRYL PLUS 4/0

## (undated) DEVICE — SUT SILK 3-0 SH 18IN BLACK

## (undated) DEVICE — TROCAR ENDOPATH XCEL 5X100MM

## (undated) DEVICE — CANNULA ENDOPATH XCEL 5X100MM

## (undated) DEVICE — SPONGE GAUZE 16PLY 4X4